# Patient Record
Sex: MALE | Race: AMERICAN INDIAN OR ALASKA NATIVE
[De-identification: names, ages, dates, MRNs, and addresses within clinical notes are randomized per-mention and may not be internally consistent; named-entity substitution may affect disease eponyms.]

---

## 2022-06-11 ENCOUNTER — HOSPITAL ENCOUNTER (INPATIENT)
Dept: HOSPITAL 5 - ED | Age: 77
LOS: 6 days | Discharge: HOME | DRG: 640 | End: 2022-06-17
Attending: INTERNAL MEDICINE | Admitting: INTERNAL MEDICINE
Payer: COMMERCIAL

## 2022-06-11 DIAGNOSIS — I25.2: ICD-10-CM

## 2022-06-11 DIAGNOSIS — I49.3: ICD-10-CM

## 2022-06-11 DIAGNOSIS — E11.65: ICD-10-CM

## 2022-06-11 DIAGNOSIS — E87.1: ICD-10-CM

## 2022-06-11 DIAGNOSIS — E87.5: Primary | ICD-10-CM

## 2022-06-11 DIAGNOSIS — E11.22: ICD-10-CM

## 2022-06-11 DIAGNOSIS — N17.0: ICD-10-CM

## 2022-06-11 DIAGNOSIS — N28.1: ICD-10-CM

## 2022-06-11 DIAGNOSIS — A08.4: ICD-10-CM

## 2022-06-11 DIAGNOSIS — E87.2: ICD-10-CM

## 2022-06-11 DIAGNOSIS — N18.9: ICD-10-CM

## 2022-06-11 LAB
BASOPHILS # (AUTO): 0 K/MM3 (ref 0–0.1)
BASOPHILS NFR BLD AUTO: 0.4 % (ref 0–1.8)
EOSINOPHIL # BLD AUTO: 0.1 K/MM3 (ref 0–0.4)
EOSINOPHIL NFR BLD AUTO: 1.5 % (ref 0–4.3)
HCT VFR BLD CALC: 43.4 % (ref 35.5–45.6)
HGB BLD-MCNC: 14.8 GM/DL (ref 11.8–15.2)
LYMPHOCYTES # BLD AUTO: 1.5 K/MM3 (ref 1.2–5.4)
LYMPHOCYTES NFR BLD AUTO: 21.4 % (ref 13.4–35)
MCHC RBC AUTO-ENTMCNC: 34 % (ref 32–34)
MCV RBC AUTO: 97 FL (ref 84–94)
MONOCYTES # (AUTO): 0.6 K/MM3 (ref 0–0.8)
MONOCYTES % (AUTO): 9.2 % (ref 0–7.3)
PLATELET # BLD: 183 K/MM3 (ref 140–440)
RBC # BLD AUTO: 4.5 M/MM3 (ref 3.65–5.03)

## 2022-06-11 PROCEDURE — 76770 US EXAM ABDO BACK WALL COMP: CPT

## 2022-06-11 PROCEDURE — 84300 ASSAY OF URINE SODIUM: CPT

## 2022-06-11 PROCEDURE — 82570 ASSAY OF URINE CREATININE: CPT

## 2022-06-11 PROCEDURE — 36415 COLL VENOUS BLD VENIPUNCTURE: CPT

## 2022-06-11 PROCEDURE — 82962 GLUCOSE BLOOD TEST: CPT

## 2022-06-11 PROCEDURE — 80076 HEPATIC FUNCTION PANEL: CPT

## 2022-06-11 PROCEDURE — 83735 ASSAY OF MAGNESIUM: CPT

## 2022-06-11 PROCEDURE — 93005 ELECTROCARDIOGRAM TRACING: CPT

## 2022-06-11 PROCEDURE — 85730 THROMBOPLASTIN TIME PARTIAL: CPT

## 2022-06-11 PROCEDURE — 80048 BASIC METABOLIC PNL TOTAL CA: CPT

## 2022-06-11 PROCEDURE — 85025 COMPLETE CBC W/AUTO DIFF WBC: CPT

## 2022-06-11 PROCEDURE — 87040 BLOOD CULTURE FOR BACTERIA: CPT

## 2022-06-11 PROCEDURE — 85610 PROTHROMBIN TIME: CPT

## 2022-06-11 PROCEDURE — 83036 HEMOGLOBIN GLYCOSYLATED A1C: CPT

## 2022-06-11 PROCEDURE — 81001 URINALYSIS AUTO W/SCOPE: CPT

## 2022-06-11 NOTE — EMERGENCY DEPARTMENT REPORT
ED General Adult HPI





- General


Chief complaint: Recheck/Abnormal Lab/Rx


Stated complaint: ABNORMAL LAB VALUES


Time Seen by Provider: 06/11/22 23:05


Source: patient, EMS


Mode of arrival: Stretcher


Limitations: No Limitations





- History of Present Illness


Initial comments: 





Patient is 77 years old male with history of atrial fibrillation.  Patient 

brought to the emergency room via EMS from [facility] urgent care for 

evaluation of abnormal labs.  Patient presented to the urgent care complaining 

of diarrhea for 1 week.  Patient describes his diarrhea as watery.  Denies any 

abdominal pain.  No nausea or vomiting.  Patient also denied any fever or 

chills.  No chest pain or shortness of breath.  Urgent care lab work-up found 

patient to have a potassium of 7.2 creatinine of 8.3.  Patient received calcium 

chloride, normal saline and magnesium sulfate by urgent care.  Upon arrival to 

the ER patient is alert, oriented x3 in no acute distress.





- Related Data


                                    Allergies











Allergy/AdvReac Type Severity Reaction Status Date / Time


 


No Known Allergies Allergy   Unverified 06/11/22 23:05














ED Review of Systems


ROS: 


Stated complaint: ABNORMAL LAB VALUES


Other details as noted in HPI





Comment: All other systems reviewed and negative


Constitutional: denies: chills, fever


Respiratory: denies: cough, shortness of breath, SOB with exertion, SOB at rest,

wheezing


Cardiovascular: denies: chest pain, palpitations, dyspnea on exertion, orthopnea


Gastrointestinal: denies: abdominal pain, nausea, vomiting


Musculoskeletal: denies: back pain


Neurological: denies: headache, weakness, numbness, paresthesias, confusion, 

abnormal gait





ED Past Medical Hx





- Past Medical History


Previous Medical History?: Yes


Hx Heart Attack/AMI: Yes


Hx Diabetes: Yes





- Social History


Smoking Status: Never Smoker


Substance Use Type: None





ED Physical Exam





- General


Limitations: No Limitations


General appearance: alert, in no apparent distress





- Head


Head exam: Present: atraumatic, normocephalic, normal inspection





- Eye


Eye exam: Present: normal appearance





- ENT


ENT exam: Present: mucous membranes dry





- Neck


Neck exam: Present: normal inspection, full ROM.  Absent: tenderness, meningism

us





- Respiratory


Respiratory exam: Present: normal lung sounds bilaterally





- Cardiovascular


Cardiovascular Exam: Present: regular rate, normal rhythm, normal heart sounds





- GI/Abdominal


GI/Abdominal exam: Present: soft, normal bowel sounds.  Absent: distended, 

tenderness, guarding, rebound, rigid, organomegaly, mass, bruit, pulsatile mass,

hernia





- Extremities Exam


Extremities exam: Present: normal inspection, full ROM, normal capillary refill.

 Absent: tenderness





- Back Exam


Back exam: Present: normal inspection, full ROM.  Absent: CVA tenderness (R), 

CVA tenderness (L)





- Neurological Exam


Neurological exam: Present: alert, oriented X3, CN II-XII intact





- Psychiatric


Psychiatric exam: Present: normal mood





- Skin


Skin exam: Present: warm, intact, normal color





ED Course


                                   Vital Signs











  06/11/22





  22:53


 


Temperature 98 F


 


Pulse Rate 84


 


Respiratory 18





Rate 


 


Blood Pressure 128/61


 


O2 Sat by Pulse 98





Oximetry 














ED Medical Decision Making





- Lab Data


Result diagrams: 


                                 06/11/22 23:23





                                 06/11/22 23:23





- EKG Data


-: EKG Interpreted by Me


EKG shows normal: sinus rhythm


Rate: normal





- EKG Data


Interpretation: no acute changes





- Radiology Data


Radiology results: report reviewed





- Medical Decision Making





Patient is 77 years old male with history of atrial fibrillation.  Patient 

brought to the emergency room via EMS from [facility] urgent care for 

evaluation of abnormal labs.  Patient presented to the urgent care complaining 

of diarrhea for 1 week.  Patient describes his diarrhea as watery.  Denies any 

abdominal pain.  No nausea or vomiting.  Patient also denied any fever or 

chills.  No chest pain or shortness of breath.  Urgent care lab work-up found akosua agudelo to have a potassium of 7.2 creatinine of 8.3.  Patient received calcium 

chloride, normal saline and magnesium sulfate by urgent care.  Upon arrival to 

the ER patient is alert, oriented x3 in no acute distress.





Patient received normal saline, D50 and insulin.  I repeated his labs his 

potassium is coming down to 5.9 creatinine is still 8.3 with a BUN of 80.  I 

discussed the patient with Dr. Valverde from [facility], he reviewed 

patient previous record and stated that his creatinine was 1.22 weeks ago.  He 

also  advised to admit the patient to [facility].





I discussed the patient with Dr. Esparza, nephrologist on-call and he advised to 

start patient on a bicarb drip and give albuterol.


I discussed the patient with Dr. Grimm, he agreed to admit the patient to 

medical service for further management.








Critical Care Time: Yes


Critical care time in (mins) excluding proc time.: 35


Critical care attestation.: 


If time is entered above; I have spent that time in minutes in the direct care 

of this critically ill patient, excluding procedure time.








ED Disposition


Clinical Impression: 


 Acute renal failure, Acute hyperkalemia, Acute diarrhea





Disposition: 09 ADMITTED AS INPATIENT


Is pt being admited?: Yes


Condition: Stable

## 2022-06-12 LAB
ALBUMIN SERPL-MCNC: 3.8 G/DL (ref 3.9–5)
ALT SERPL-CCNC: 37 UNITS/L (ref 7–56)
APTT BLD: 44.6 SEC. (ref 24.2–36.6)
BILIRUB DIRECT SERPL-MCNC: < 0.2 MG/DL (ref 0–0.2)
BUN SERPL-MCNC: 76 MG/DL (ref 9–20)
BUN SERPL-MCNC: 83 MG/DL (ref 9–20)
BUN/CREAT SERPL: 10 %
BUN/CREAT SERPL: 10 %
CALCIUM SERPL-MCNC: 8.2 MG/DL (ref 8.4–10.2)
CALCIUM SERPL-MCNC: 8.9 MG/DL (ref 8.4–10.2)
HEMOLYSIS INDEX: 148
HEMOLYSIS INDEX: 6
INR PPP: 3.8 (ref 0.87–1.13)

## 2022-06-12 RX ADMIN — INSULIN LISPRO SCH: 100 INJECTION, SOLUTION INTRAVENOUS; SUBCUTANEOUS at 23:40

## 2022-06-12 RX ADMIN — INSULIN LISPRO SCH: 100 INJECTION, SOLUTION INTRAVENOUS; SUBCUTANEOUS at 12:25

## 2022-06-12 RX ADMIN — SODIUM CHLORIDE SCH MLS/HR: 0.9 INJECTION, SOLUTION INTRAVENOUS at 05:23

## 2022-06-12 RX ADMIN — SODIUM CHLORIDE SCH MLS/HR: 0.9 INJECTION, SOLUTION INTRAVENOUS at 11:15

## 2022-06-12 RX ADMIN — Medication SCH ML: at 22:11

## 2022-06-12 RX ADMIN — SODIUM BICARBONATE SCH MLS/HR: 84 INJECTION, SOLUTION INTRAVENOUS at 15:22

## 2022-06-12 RX ADMIN — INSULIN LISPRO SCH: 100 INJECTION, SOLUTION INTRAVENOUS; SUBCUTANEOUS at 17:55

## 2022-06-12 RX ADMIN — LOPERAMIDE HYDROCHLORIDE PRN MG: 2 CAPSULE ORAL at 22:09

## 2022-06-12 RX ADMIN — Medication SCH ML: at 10:45

## 2022-06-12 RX ADMIN — DEXTROSE MONOHYDRATE PRN ML: 25 INJECTION, SOLUTION INTRAVENOUS at 18:52

## 2022-06-12 NOTE — EVENT NOTE
Date: 06/12/22





Patient seen and examined


This is the second visit after midnight


77-year-old male with known history of atrial fibrillation presents to the emerg

ency room via EMS for evaluation of abnormal labs done [place] [facility] 

[place]ent care facility.  Patient had gone to the urgent care facility with a 

complaint of diarrhea which has been ongoing for about a week.  


Labs done at the urgent care facility reveals elevated potassium level of 7.2 

and creatinine of 8.3.  


Patient was given insulin and glucose and nebulizing treatment at the facility.


A repeat test done in the emergency room showed a potassium level of 5.9 and 

creatinine of 8.2 with a BUN of 83.


EKG was unremarkable.


Nephrologist on-call was immediately consulted by the ER physician and patient 

also admitted to the hospital for further evaluation and management


 with acute kidney failure, hyperkalemia and diarrhea.


Today patient continued to have diarrhea, repeat potassium 7.0, bicarb ordered 

5, creatinine 7.3 and BUN 76


Ordered for the sodium bicarbonate drip, ordered another 5 units of insulin, 2 g

of calcium gluconate


Ordered CT chest for diarrhea, and loperamide


Continue to follow BMP, guarded prognosis


Patient denies any chest pain or shortness of breath, vitals noted and stable


Avoid nephrotoxins





-It took me about 28 minutes to reevaluate and reasses this patient, discussed 

with RN/CM, review medical documents, lab results, imaging, medication list and 

placing order.

## 2022-06-12 NOTE — CONSULTATION
History of Present Illness





- History of Present Illness


Thank you for the consultation !





Patient was evaluated today, 





My assessment and plan are as follows


#Acute kidney injury, most likely resulting from severe volume depletion the 

patient is a 77-year-old with a known baseline creatinine of around 1.2,


Is currently being treated with IV hydration, suggest close monitoring of renal 

function given that his baseline creatinine is near normal


Renal prognosis remains guarded at this time


In the outpatient setting he is being followed by [facility]





#Hyperkalemia patient potassium was 7.3 currently at 5.9 with severe acidosis 

bicarbonate of 13 in the setting of diarrhea, believe upon correction of 

acidosis his potassium should get normal or even lower to follow, his 

hyperkalemia has been treated conservatively here did not require dialysis





#Hyponatremia most likely resulting from renal failure, volume depletion, to 

monitor and follow





Blood pressure has been low around 101/65 this morning,





Will order further labs, renal ultrasonogram, urinalysis, continue with 

supportive care IV fluid boluses can be administered if required, patient 

appears to be severely volume depleted,


If you have any question in regards to this patient renal care please feel free 

to contact me at [phone]





Author:


Karlo Esparza M.D.


[facility], [address]


[address]


[place] [address]


Tel; [phone]


[employer]





Source of information: From patient 





History of present illness





Patient is a 77-year-old male who has been admitted here after he was noted to 

be in acute kidney injury had labs drawn at [facility] which showed a potassium of 

7.3, creatinine earlier a few week ago was around 1.2 as noted in the chart 


Patient has been having issues with watery stool, very poor appetite, feeling 

weak, in the outpatient setting his potassium was noted to be around 7.2 however

here at 5.9, his hyperkalemia has been treated here conservatively, currently 

pending new set of labs,





Past medical history:


Reviewed from the current chart





Current allergies: Reviewed from the current chart





Social history: Reviewed from the current chart





Family history: Reviewed from the current chart








Review of system:


Positive for


All other review of systems negative





Physical examination


Vitals: Reviewed


General: No acute distress


HEENT: Oral mucosa appears to be dry


Neck: Supple without any JVD thyromegaly or nodular mass


Chest: Clear to auscultation


Heart: Regular rate and rhythm S1-S2 heard no S3-S4


Abdomen: Soft nontender, bowel sounds present no renal bruit no suprapubic 

masses no CVA tenderness noted


Extremity: No edema skin turgor reduced endocrine: Thyroid not enlarged


Psychiatric: No agitation and aggression noted


Musculoskeletal: No joint effusion noted








Labs and x-rays: Reviewed from this admission











Past History


Past Medical History: acute MI, diabetes


Past Surgical History: Other (Right partial foot amputation)


Social history: no significant social history


Family history: no significant family history





Medications and Allergies


                                    Allergies











Allergy/AdvReac Type Severity Reaction Status Date / Time


 


No Known Allergies Allergy   Unverified 06/11/22 23:05











Active Meds: 


Active Medications





Acetaminophen (Acetaminophen 325 Mg Tab)  650 mg PO Q4H PRN


   PRN Reason: Pain MILD(1-3)/Fever >100.5/HA


Dextrose (Dextrose 50% In Water (25gm) 50 Ml Syringe)  0 ml IV Q30MIN PRN; 

Protocol


   PRN Reason: Hypoglycemia


Sodium Chloride (Nacl 0.9% 1000 Ml)  1,000 mls @ 125 mls/hr IV AS DIRECT RIN


   Last Admin: 06/12/22 05:23 Dose:  125 mls/hr


   


Insulin Human Lispro (Insulin Lispro 100 Unit/Ml)  0 unit SUB-Q ACHS RIN; 

Protocol


Magnesium Hydroxide (Magnesium Hydroxide (Mom) Oral Liqd Udc)  30 ml PO Q4H PRN


   PRN Reason: Constipation


Morphine Sulfate (Morphine 2 Mg/1 Ml Inj)  2 mg IV Q4H PRN


   PRN Reason: Pain, Moderate (4-6)


Morphine Sulfate (Morphine 4 Mg/1 Ml Inj)  4 mg IV Q4H PRN


   PRN Reason: Pain , Severe (7-10)


Ondansetron HCl (Ondansetron 4 Mg/2 Ml Inj)  4 mg IV Q8H PRN


   PRN Reason: Nausea And Vomiting


Sodium Chloride (Sodium Chloride 0.9% 10 Ml Flush Syringe)  10 ml IV BID RIN


Sodium Chloride (Sodium Chloride 0.9% 10 Ml Flush Syringe)  10 ml IV PRN PRN


   PRN Reason: LINE FLUSH











Exam





- Vital Signs


Vital signs: 


                                   Vital Signs











Temp Pulse Resp BP Pulse Ox


 


 98 F   84   18   128/61   98 


 


 06/11/22 22:53  06/11/22 22:53  06/11/22 22:53  06/11/22 22:53  06/11/22 22:53














Results





- Lab Results





                                 06/11/22 23:23





                                 06/11/22 23:23


                             Most recent lab results











Calcium  8.9 mg/dL (8.4-10.2)   06/11/22  23:23

## 2022-06-12 NOTE — HISTORY AND PHYSICAL REPORT
History of Present Illness


Date of examination: 06/12/22


Date of admission: 


06/12/2022


Chief complaint: 





Abnormal labs


History of present illness: 





77-year-old male with known history of atrial fibrillation presents to the 

emergency room today via EMS for evaluation of abnormal labs done at the [facility] urgent care facility.  Patient had gone to the urgent care facility 

with a complaint of diarrhea which has been ongoing for about a week.  He denies

any abdominal pain, no fever or chills, no chest pain or shortness of breath, no

headache or dizziness and no diaphoresis.


Patient denies any sick contacts and no recent travel.  Denies any contact with 

anyone with COVID-19.





Labs done at the urgent care facility reveals elevated potassium level of 7.2 an

d creatinine of 8.3.  Patient was given insulin and glucose and nebulizing 

treatment at the facility.





A repeat test done in the emergency room here shows a potassium level of 5.9 and

creatinine of 3.2 with a BUN of 83.


EKG was unremarkable.


Nephrologist on-call was immediately consulted by the ER physician and 

recommendation was to commence patient on sodium bicarbonate drip.





Patient is being admitted with acute kidney failure, hyperkalemia and diarrhea.





Past History


Past Medical History: acute MI, diabetes


Past Surgical History: Other (Right partial foot amputation)


Social history: no significant social history


Family history: no significant family history





Medications and Allergies


                                    Allergies











Allergy/AdvReac Type Severity Reaction Status Date / Time


 


No Known Allergies Allergy   Unverified 06/11/22 23:05














Review of Systems


Constitutional: no fever, no chills


Ears, nose, mouth and throat: no nasal congestion, no sore throat


Cardiovascular: no chest pain, no palpitations


Respiratory: no cough, no shortness of breath


Gastrointestinal: diarrhea, no abdominal pain, no nausea, no vomiting


Genitourinary Male: no dysuria, no hematuria, no flank pain, no nocturia, no 

polyuria


Musculoskeletal: no neck pain, no low back pain


Integumentary: no rash, no pruritis


Neurological: no headaches, no confusion


Psychiatric: no anxiety, no depression


Endocrine: no polyphagia, no polydipsia, no polyuria, no nocturia





Exam





- Constitutional


Vitals: 


                                        











Temp Pulse Resp BP Pulse Ox


 


 98 F   77   18   109/64   96 


 


 06/11/22 22:53  06/12/22 03:15  06/12/22 03:15  06/12/22 03:15  06/12/22 01:15











General appearance: Present: no acute distress, well-nourished





- EENT


Eyes: Present: PERRL, EOM intact.  Absent: scleral icterus


ENT: hearing intact, clear oral mucosa, dentition normal





- Neck


Neck: Present: supple, normal ROM





- Respiratory


Respiratory effort: normal


Respiratory: bilateral: CTA





- Cardiovascular


Rhythm: regular


Heart Sounds: Present: S1 & S2, rub, click.  Absent: systolic murmur, diastolic 

murmur





- Extremities


Extremities: no ischemia, pulses intact, pulses symmetrical, No edema, normal 

temperature, normal color, Full ROM, abnormal (Partial amputation of the right 

foot)


Peripheral Pulses: within normal limits





- Abdominal


General gastrointestinal: Present: soft, non-tender, non-distended, normal bowel

sounds.  Absent: mass





- Integumentary


Integumentary: Present: clear, warm, dry, normal turgor.  Absent: rash





- Musculoskeletal


Musculoskeletal: strength equal bilaterally





- Psychiatric


Psychiatric: appropriate mood/affect, intact judgment & insight, memory intact, 

cooperative





- Neurologic


Neurologic: CNII-XII intact, no focal deficits, moves all extremities





Results





- Labs


CBC & Chem 7: 


                                 06/11/22 23:23





                                 06/11/22 23:23


Labs: 


                              Abnormal lab results











  06/11/22 06/11/22 06/11/22 Range/Units





  23:23 23:23 23:38 


 


MCV  97 H    (84-94)  fl


 


MCH  33 H    (28-32)  pg


 


RDW  15.6 H    (13.2-15.2)  %


 


Mono % (Auto)  9.2 H    (0.0-7.3)  %


 


PT    42.7 H  (12.2-14.9)  Sec.


 


INR    3.80 H  (0.87-1.13)  


 


APTT    44.6 H  (24.2-36.6)  Sec.


 


Sodium   135 L   (137-145)  mmol/L


 


Potassium   5.9 H   (3.6-5.0)  mmol/L


 


Carbon Dioxide   13 L   (22-30)  mmol/L


 


BUN   83 H   (9-20)  mg/dL


 


Creatinine   8.2 H   (0.8-1.3)  mg/dL


 


Alkaline Phosphatase   243 H   ()  units/L


 


Total Protein   8.3 H   (6.3-8.2)  g/dL


 


Albumin   3.8 L   (3.9-5)  g/dL














Assessment and Plan





Assessment:





1.  Acute renal failure





2.  Hypokalemia





3.  Diarrhea-etiology unclear





4.  History of A. fib-rate controlled





Plan:





1.  Patient admitted and placed on IV fluid and sodium bicarb drip as 

recommended by the nephrologist.





2.  We will monitor patient on telemetry.





3.  We will continue to monitor potassium levels.





4.  We will resume routine home medications once reconciled.





5.  Consult placed to nephrology for further evaluation and recommendations.








DVT prophylaxis: Subcutaneous heparin








CODE STATUS: Patient is full code

## 2022-06-12 NOTE — ELECTROCARDIOGRAPH REPORT
[facility]

                                       

Test Date:    2022               Test Time:    01:51:42

Pat Name:     SOFIA CARRILLO            Department:   

Patient ID:   [id number]          Room:         A477 1

Gender:       M                        Technician:   MACY

:          [id number]               Requested By: ALBERT ARRIAGA

Order Number: [id number]              Reading MD:   Magi Cali

                                 Measurements

Intervals                              Axis          

Rate:         73                       P:            0

MI:           184                      QRS:          -49

QRSD:         106                      T:            74

QT:           418                                    

QTc:          465                                    

                           Interpretive Statements

Sinus rhythm

Multiform ventricular premature complexes

Inferior infarct, old

Anterior infarct, old

No previous ECG available for comparison

Electronically Signed On 2022 21:40:42 EDT by Magi Cali

## 2022-06-13 LAB
BASOPHILS # (AUTO): 0 K/MM3 (ref 0–0.1)
BASOPHILS NFR BLD AUTO: 0.3 % (ref 0–1.8)
BUN SERPL-MCNC: 71 MG/DL (ref 9–20)
BUN/CREAT SERPL: 11 %
CALCIUM SERPL-MCNC: 8.8 MG/DL (ref 8.4–10.2)
EOSINOPHIL # BLD AUTO: 0.1 K/MM3 (ref 0–0.4)
EOSINOPHIL NFR BLD AUTO: 2.4 % (ref 0–4.3)
HCT VFR BLD CALC: 41.1 % (ref 35.5–45.6)
HEMOLYSIS INDEX: 12
HGB BLD-MCNC: 13.5 GM/DL (ref 11.8–15.2)
LYMPHOCYTES # BLD AUTO: 1 K/MM3 (ref 1.2–5.4)
LYMPHOCYTES NFR BLD AUTO: 20.7 % (ref 13.4–35)
MCHC RBC AUTO-ENTMCNC: 33 % (ref 32–34)
MCV RBC AUTO: 96 FL (ref 84–94)
MONOCYTES # (AUTO): 0.5 K/MM3 (ref 0–0.8)
MONOCYTES % (AUTO): 10.8 % (ref 0–7.3)
PLATELET # BLD: 152 K/MM3 (ref 140–440)
RBC # BLD AUTO: 4.29 M/MM3 (ref 3.65–5.03)

## 2022-06-13 RX ADMIN — LOPERAMIDE HYDROCHLORIDE PRN MG: 2 CAPSULE ORAL at 09:19

## 2022-06-13 RX ADMIN — ONDANSETRON PRN MG: 2 INJECTION INTRAMUSCULAR; INTRAVENOUS at 06:16

## 2022-06-13 RX ADMIN — ONDANSETRON PRN MG: 2 INJECTION INTRAMUSCULAR; INTRAVENOUS at 09:19

## 2022-06-13 RX ADMIN — LOPERAMIDE HYDROCHLORIDE PRN MG: 2 CAPSULE ORAL at 15:02

## 2022-06-13 RX ADMIN — LOPERAMIDE HYDROCHLORIDE PRN MG: 2 CAPSULE ORAL at 01:20

## 2022-06-13 RX ADMIN — INSULIN LISPRO SCH: 100 INJECTION, SOLUTION INTRAVENOUS; SUBCUTANEOUS at 16:55

## 2022-06-13 RX ADMIN — Medication SCH ML: at 09:19

## 2022-06-13 RX ADMIN — INSULIN LISPRO SCH: 100 INJECTION, SOLUTION INTRAVENOUS; SUBCUTANEOUS at 22:00

## 2022-06-13 RX ADMIN — INSULIN LISPRO SCH: 100 INJECTION, SOLUTION INTRAVENOUS; SUBCUTANEOUS at 13:30

## 2022-06-13 RX ADMIN — Medication SCH ML: at 22:00

## 2022-06-13 RX ADMIN — INSULIN LISPRO SCH: 100 INJECTION, SOLUTION INTRAVENOUS; SUBCUTANEOUS at 09:18

## 2022-06-13 NOTE — ULTRASOUND REPORT
ULTRASOUND RENAL



INDICATION / CLINICAL INFORMATION:

NICOLASA.



COMPARISON:

None available.



FINDINGS:

RIGHT KIDNEY: Length = 10.6 cm.    [normal > 9 cm] 

- Parenchymal Thickness = 1.4 cm.  [normal > 1.5 cm] 

- Echogenicity: Normal.

- Hydronephrosis: None.

- Cyst or mass: 1 cm exophytic cyst is noted near mid pole anteriorly.  

- Stones: None seen. 



LEFT KIDNEY: Length = 9.4 cm.       [normal > 9 cm] 

- Parenchymal Thickness = 1.5 cm.  [normal > 1.5 cm] 

- Echogenicity: Normal.

- Hydronephrosis: None.

- Cyst or mass: No significant abnormality.  

- Stones: None seen. 



URINARY BLADDER: No significant abnormality.

FREE FLUID: None.



ADDITIONAL FINDINGS: None.



IMPRESSION:

Small right renal cyst, otherwise, unremarkable exam.



Signer Name: Estuardo Dodge Jr, MD 

Signed: 6/13/2022 1:21 PM

Workstation Name: [id number]

## 2022-06-13 NOTE — PROGRESS NOTE
Assessment and Plan





Impression:


* Acute kidney injury secondary volume depletion on chronic kidney disease


   --Baseline renal function unknown; followed by [facility]


* Hyperkalemia, severe


* Metabolic acidosis


* Diarrhea





Plan:


* No acute indication for renal replacement therapy at this time


* Hyperkalemia resolved with medical management - continue


* Continue bicarb gtt


* Will obtain UA and urine lytes


* Will obtain renal ultrasound


* Dose medications for renal function


* Avoid potential nephrotoxins


* Strict I/O


* AM labs





Subjective


Date of service: 06/13/22


Interval history: 





Patient reports diarrhea.  He denies abdominal pain, nausea, vomiting.





Objective





- Vital Signs


Vital signs: 


                               Vital Signs - 12hr











  06/12/22 06/12/22 06/13/22





  21:31 22:00 01:15


 


Temperature 98.1 F  97.8 F


 


Pulse Rate 55 L  84


 


Respiratory 16  16





Rate   


 


Blood Pressure 138/72  147/83


 


O2 Sat by Pulse 98 97 97





Oximetry   














  06/13/22 06/13/22





  05:12 07:53


 


Temperature 98.5 F 98.3 F


 


Pulse Rate 65 52 L


 


Respiratory 16 





Rate  


 


Blood Pressure 141/67 154/72


 


O2 Sat by Pulse 92 91





Oximetry  














- General Appearance


General appearance: well-developed, well-nourished


EENT: ATNC


Respiratory: Present: Clear to Ascultation


Cardiology: regular, S1S2


Gastrointestinal: normal, no tenderness, no distended


Integumentary: no rash


Neurologic: alert and oriented x3


Psychiatric: cooperative





- Lab





                                 06/13/22 05:27





                                 06/13/22 05:27


                             Most recent lab results











Calcium  8.8 mg/dL (8.4-10.2)   06/13/22  05:27    














Medications & Allergies





- Medications


Allergies/Adverse Reactions: 


                                    Allergies





No Known Allergies Allergy (Unverified 06/11/22 23:05)


   








Active Medications: 














Generic Name Dose Route Start Last Admin





  Trade Name Freq  PRN Reason Stop Dose Admin


 


Acetaminophen  650 mg  06/12/22 03:36 





  Acetaminophen 325 Mg Tab  PO  





  Q4H PRN  





  Pain MILD(1-3)/Fever >100.5/HA  


 


Dextrose  0 ml  06/12/22 03:36  06/12/22 18:52





  Dextrose 50% In Water (25gm) 50 Ml Syringe  IV   25 ml





  Q30MIN PRN   Administration





  Hypoglycemia  





  Protocol  


 


Sodium Bicarbonate 150 meq/  1,150 mls @ 75 mls/hr  06/12/22 15:00  06/12/22 

15:22





  Dextrose  IV   75 mls/hr





  AS DIRECT RIN   Administration


 


Insulin Human Lispro  0 unit  06/12/22 07:30  06/12/22 23:40





  Insulin Lispro 100 Unit/Ml  SUB-Q   Not Given





  [facility]  





  Protocol  


 


Loperamide HCl  2 mg  06/12/22 17:29  06/13/22 01:20





  Loperamide 2 Mg Cap  PO   2 mg





  Q2H PRN   Administration





  Diarrhea  


 


Magnesium Hydroxide  30 ml  06/12/22 03:36 





  Magnesium Hydroxide (Mom) Oral Liqd Udc  PO  





  Q4H PRN  





  Constipation  


 


Morphine Sulfate  2 mg  06/12/22 03:36 





  Morphine 2 Mg/1 Ml Inj  IV  





  Q4H PRN  





  Pain, Moderate (4-6)  


 


Morphine Sulfate  4 mg  06/12/22 03:36 





  Morphine 4 Mg/1 Ml Inj  IV  





  Q4H PRN  





  Pain , Severe (7-10)  


 


Ondansetron HCl  4 mg  06/12/22 03:36  06/13/22 06:16





  Ondansetron 4 Mg/2 Ml Inj  IV   4 mg





  Q8H PRN   Administration





  Nausea And Vomiting  


 


Sodium Chloride  10 ml  06/12/22 10:00  06/12/22 22:11





  Sodium Chloride 0.9% 10 Ml Flush Syringe  IV   10 ml





  BID RIN   Administration


 


Sodium Chloride  10 ml  06/12/22 03:36 





  Sodium Chloride 0.9% 10 Ml Flush Syringe  IV  





  PRN PRN  





  LINE FLUSH

## 2022-06-13 NOTE — PROGRESS NOTE
Assessment and Plan





77-year-old male with known history of atrial fibrillation presents to the 

emergency room via EMS for evaluation of abnormal labs done at the [facility] urgent care facility.  Patient had gone to the urgent care facility 

with a complaint of diarrhea which has been ongoing for about a week.  Labs done

at the urgent care facility reveals elevated potassium level of 7.2 and 

creatinine of 8.3.  


Patient was given insulin and glucose and nebulizing treatment at the facility 

and sent to [facility] ER for further evaluation and management.





Assessment:


--Acute renal failure likely ATN with history of underlying CKD unknown baseline

renal function


-- Severe hyperkalemia


--Diarrhea-etiology unclear


-- History of A. fib-rate controlled





Plan:


6/12/22: 


A repeat test done in the emergency room showed a potassium level of 5.9 and 

creatinine of 8.2 with a BUN of 83.


EKG was unremarkable.


Nephrologist on-call was immediately consulted by the ER physician and patient 

also admitted to the hospital for further evaluation and management


 with acute kidney failure, hyperkalemia and diarrhea.


Today patient continued to have diarrhea, repeat potassium 7.0, bicarb ordered 

5, creatinine 7.3 and BUN 76


Ordered for the sodium bicarbonate drip, ordered another 5 units of insulin, 2 g

of calcium gluconate


Ordered CT chest for diarrhea, and loperamide


Continue to follow BMP, guarded prognosis


Patient denies any chest pain or shortness of breath, vitals noted and stable


Avoid nephrotoxins





6/13/22; 


Continue sodium bicarbonate drip


Potassium 5.1 today With creatinine 6.2


Avoid nephrotoxins, follow BMP


Discussed with nephrologist and no plan for renal replacement therapy at this 

time


Order C. difficile study yesterday -unlikely to be positive as patient does not 

appear to be toxic from underlying infection


Added loperamide as needed for diarrhea








Subjective


Date of service: 06/13/22


Interval history: 





Patient seen and examined.  Medical records and medication list reviewed.  


No acute event overnight noted by the RN.  


Patient denies any chest pain or difficulty breathing.  Patient is tolerating 

diet.  


Continue to have diarrhea


Discussed plan of care at bedside with patient.








Objective





- Exam


Narrative Exam: 





GENERAL:  well-developed and well-nourished elderly -American male lying 

on bed appeared to be in no discomfort. 


HEENT: Normocephalic.  Atraumatic.  No conjunctival congestion or icterus. 

Patient has moist mucous membranes.


NECK: Supple.  Trachea midline.


CHEST/LUNGS: Clear to auscultated bilaterally, breathing nonlabored. No wheezes 

crackles or rhonchi.


HEART/CARDIOVASCULAR: Regular in rate and rhythm.  S1 and S2 positive.


ABDOMEN: Abdomen is soft, nontender.  Patient has normal bowel sounds.  


SKIN: There is no rash.  Warm and dry.


NEURO:  No focal motor deficit.  Follows command.


MUSCULOSKELETAL: No joint effusion or tenderness.


EXTRIMITY: No edema, no cyanosis or clubbing.


PSYCH:  Cooperative.





- Constitutional


Vitals: 


                               Vital Signs - 12hr











  06/13/22 06/13/22 06/13/22





  05:12 07:53 10:00


 


Temperature 98.5 F 98.3 F 


 


Pulse Rate 65 52 L 81


 


Respiratory 16  





Rate   


 


Blood Pressure 141/67 154/72 


 


O2 Sat by Pulse 92 91 95





Oximetry   














  06/13/22





  15:46


 


Temperature 97.9 F


 


Pulse Rate 84


 


Respiratory 





Rate 


 


Blood Pressure 130/79


 


O2 Sat by Pulse 95





Oximetry 














- Labs


CBC & Chem 7: 


                                 06/13/22 05:27





                                 06/13/22 05:27


Labs: 


                              Abnormal lab results











  06/12/22 06/12/22 06/12/22 Range/Units





  15:11 16:50 18:34 


 


MCV     (84-94)  fl


 


Mono % (Auto)     (0.0-7.3)  %


 


Lymph # (Auto)     (1.2-5.4)  K/mm3


 


Potassium  7.0 H*    (3.6-5.0)  mmol/L


 


Carbon Dioxide  5 L* D    (22-30)  mmol/L


 


BUN     (9-20)  mg/dL


 


Creatinine  7.3 H    (0.8-1.3)  mg/dL


 


Glucose     ()  mg/dL


 


POC Glucose   53 L  38 L  ()  mg/dL














  06/12/22 06/12/22 06/13/22 Range/Units





  18:40 18:58 05:27 


 


MCV    96 H  (84-94)  fl


 


Mono % (Auto)    10.8 H  (0.0-7.3)  %


 


Lymph # (Auto)    1.0 L  (1.2-5.4)  K/mm3


 


Potassium     (3.6-5.0)  mmol/L


 


Carbon Dioxide     (22-30)  mmol/L


 


BUN     (9-20)  mg/dL


 


Creatinine     (0.8-1.3)  mg/dL


 


Glucose     ()  mg/dL


 


POC Glucose  47 L  19 L   ()  mg/dL














  06/13/22 06/13/22 06/13/22 Range/Units





  05:27 12:38 15:12 


 


MCV     (84-94)  fl


 


Mono % (Auto)     (0.0-7.3)  %


 


Lymph # (Auto)     (1.2-5.4)  K/mm3


 


Potassium  5.1 H D    (3.6-5.0)  mmol/L


 


Carbon Dioxide  16 L D    (22-30)  mmol/L


 


BUN  71 H    (9-20)  mg/dL


 


Creatinine  6.2 H    (0.8-1.3)  mg/dL


 


Glucose  130 H    ()  mg/dL


 


POC Glucose   33 L  46 L  ()  mg/dL














  06/13/22 Range/Units





  15:26 


 


MCV   (84-94)  fl


 


Mono % (Auto)   (0.0-7.3)  %


 


Lymph # (Auto)   (1.2-5.4)  K/mm3


 


Potassium   (3.6-5.0)  mmol/L


 


Carbon Dioxide   (22-30)  mmol/L


 


BUN   (9-20)  mg/dL


 


Creatinine   (0.8-1.3)  mg/dL


 


Glucose   ()  mg/dL


 


POC Glucose  117 H  ()  mg/dL

## 2022-06-14 LAB
BACTERIA #/AREA URNS HPF: (no result) /HPF
BILIRUB UR QL STRIP: (no result)
BLOOD UR QL VISUAL: (no result)
BUN SERPL-MCNC: (no result) MG/DL (ref 9–20)
BUN SERPL-MCNC: (no result) MG/DL (ref 9–20)
BUN SERPL-MCNC: 60 MG/DL (ref 9–20)
BUN/CREAT SERPL: (no result) %
BUN/CREAT SERPL: (no result) %
BUN/CREAT SERPL: 14 %
CALCIUM SERPL-MCNC: (no result) MG/DL (ref 8.4–10.2)
CALCIUM SERPL-MCNC: (no result) MG/DL (ref 8.4–10.2)
CALCIUM SERPL-MCNC: 8.5 MG/DL (ref 8.4–10.2)
HEMOLYSIS INDEX: (no result)
HEMOLYSIS INDEX: 192
HEMOLYSIS INDEX: 21
MUCOUS THREADS #/AREA URNS HPF: (no result) /HPF
PH UR STRIP: 5 [PH] (ref 5–7)
PROT UR STRIP-MCNC: (no result) MG/DL
RBC #/AREA URNS HPF: 1 /HPF (ref 0–6)
UROBILINOGEN UR-MCNC: < 2 MG/DL (ref ?–2)
WBC #/AREA URNS HPF: 2 /HPF (ref 0–6)

## 2022-06-14 RX ADMIN — ONDANSETRON PRN MG: 2 INJECTION INTRAMUSCULAR; INTRAVENOUS at 08:52

## 2022-06-14 RX ADMIN — INSULIN LISPRO SCH: 100 INJECTION, SOLUTION INTRAVENOUS; SUBCUTANEOUS at 17:14

## 2022-06-14 RX ADMIN — DEXTROSE MONOHYDRATE PRN ML: 25 INJECTION, SOLUTION INTRAVENOUS at 15:47

## 2022-06-14 RX ADMIN — SODIUM BICARBONATE SCH MLS/HR: 84 INJECTION, SOLUTION INTRAVENOUS at 15:37

## 2022-06-14 RX ADMIN — DEXTROSE SCH MLS/HR: 10 SOLUTION INTRAVENOUS at 17:12

## 2022-06-14 RX ADMIN — INSULIN LISPRO SCH: 100 INJECTION, SOLUTION INTRAVENOUS; SUBCUTANEOUS at 08:51

## 2022-06-14 RX ADMIN — SODIUM BICARBONATE SCH MLS/HR: 84 INJECTION, SOLUTION INTRAVENOUS at 00:44

## 2022-06-14 RX ADMIN — INSULIN LISPRO SCH: 100 INJECTION, SOLUTION INTRAVENOUS; SUBCUTANEOUS at 15:20

## 2022-06-14 RX ADMIN — LOPERAMIDE HYDROCHLORIDE PRN MG: 2 CAPSULE ORAL at 17:45

## 2022-06-14 RX ADMIN — LOPERAMIDE HYDROCHLORIDE PRN MG: 2 CAPSULE ORAL at 08:52

## 2022-06-14 RX ADMIN — INSULIN LISPRO SCH: 100 INJECTION, SOLUTION INTRAVENOUS; SUBCUTANEOUS at 22:20

## 2022-06-14 RX ADMIN — Medication SCH ML: at 22:20

## 2022-06-14 RX ADMIN — Medication SCH ML: at 16:05

## 2022-06-14 NOTE — PROGRESS NOTE
Assessment and Plan





Impression:


* Acute kidney injury secondary volume depletion on chronic kidney disease


   --Baseline renal function unknown; followed by [facility]


   --UA sediment bland


* Hyperkalemia, severe


* Metabolic acidosis


* Diarrhea


* Cyst of kidney, acquired


   --Renal ultrasound (June 13): nml echogenicity; 1cm exophytic cyst, right 

kidney





Plan:


* AM labs are pending - recollect ordered


* No acute indication for renal replacement therapy at this time


* Hyperkalemia resolved with medical management - continue


* Continue bicarb gtt at current rate pending AM labs


* Dose medications for renal function


* Avoid potential nephrotoxins


* Strict I/O


* AM labs





Subjective


Date of service: 06/14/22


Interval history: 





Patient has no complaints.  States that he feels better.  Per RN, patient w/ 7 

beat run VTach this AM.





Objective





- Vital Signs


Vital signs: 


                               Vital Signs - 12hr











  06/13/22 06/13/22 06/14/22





  22:00 23:53 04:34


 


Temperature  98.5 F 98.3 F


 


Pulse Rate  82 68


 


Respiratory  18 16





Rate   


 


Blood Pressure  138/71 154/83


 


O2 Sat by Pulse 96 96 100





Oximetry   














- General Appearance


General appearance: well-developed, well-nourished


EENT: ATNC


Respiratory: Present: Clear to Ascultation


Cardiology: regular, S1S2


Gastrointestinal: normal, no tenderness, no distended


Integumentary: no rash


Psychiatric: cooperative





- Lab





                                 06/13/22 05:27





                                 06/14/22 04:41


                             Most recent lab results











Calcium  TNR   06/14/22  04:41    














Medications & Allergies





- Medications


Allergies/Adverse Reactions: 


                                    Allergies





No Known Allergies Allergy (Unverified 06/11/22 23:05)


   








Active Medications: 














Generic Name Dose Route Start Last Admin





  Trade Name Freq  PRN Reason Stop Dose Admin


 


Acetaminophen  650 mg  06/12/22 03:36 





  Acetaminophen 325 Mg Tab  PO  





  Q4H PRN  





  Pain MILD(1-3)/Fever >100.5/HA  


 


Dextrose  0 ml  06/12/22 03:36  06/12/22 18:52





  Dextrose 50% In Water (25gm) 50 Ml Syringe  IV   25 ml





  Q30MIN PRN   Administration





  Hypoglycemia  





  Protocol  


 


Sodium Bicarbonate 150 meq/  1,150 mls @ 75 mls/hr  06/12/22 15:00  06/14/22 

00:44





  Dextrose  IV   75 mls/hr





  AS DIRECT RIN   Administration


 


Insulin Human Lispro  0 unit  06/12/22 07:30  06/13/22 22:00





  Insulin Lispro 100 Unit/Ml  SUB-Q   Not Given





  [facility]  





  Protocol  


 


Loperamide HCl  2 mg  06/12/22 17:29  06/13/22 15:02





  Loperamide 2 Mg Cap  PO   2 mg





  Q2H PRN   Administration





  Diarrhea  


 


Magnesium Hydroxide  30 ml  06/12/22 03:36 





  Magnesium Hydroxide (Mom) Oral Liqd Udc  PO  





  Q4H PRN  





  Constipation  


 


Morphine Sulfate  2 mg  06/12/22 03:36 





  Morphine 2 Mg/1 Ml Inj  IV  





  Q4H PRN  





  Pain, Moderate (4-6)  


 


Morphine Sulfate  4 mg  06/12/22 03:36 





  Morphine 4 Mg/1 Ml Inj  IV  





  Q4H PRN  





  Pain , Severe (7-10)  


 


Ondansetron HCl  4 mg  06/12/22 03:36  06/13/22 09:19





  Ondansetron 4 Mg/2 Ml Inj  IV   4 mg





  Q8H PRN   Administration





  Nausea And Vomiting  


 


Sodium Chloride  10 ml  06/12/22 10:00  06/13/22 22:00





  Sodium Chloride 0.9% 10 Ml Flush Syringe  IV   10 ml





  BID RIN   Administration


 


Sodium Chloride  10 ml  06/12/22 03:36 





  Sodium Chloride 0.9% 10 Ml Flush Syringe  IV  





  PRN PRN  





  LINE FLUSH

## 2022-06-14 NOTE — PROGRESS NOTE
Assessment and Plan


Assessment and plan: 


77-year-old male patient with known history of atrial fibrillation presents to 

the emergency room via EMS for evaluation of abnormal labs done at the [facility] urgent care facility.  Patient had gone to the urgent care facility 

with a complaint of diarrhea which has been ongoing for about a week.  Labs done

at the urgent care facility reveals elevated potassium level of 7.2 and 

creatinine of 8.3.  


Patient was given insulin and glucose and nebulizing treatment at the facility 

and sent to [facility] ER for further evaluation and management.





Assessment:


--Acute renal failure likely ATN with history of underlying CKD unknown baseline

renal function monitor renal function


Monitor renal function, avoid nephrotoxin, nephrology following





-- Severe hyperkalemia;


Corrected per protocol, follow electrolytes





--Episodes of hypoglycemia;


Encourage the patient to have increased oral nutrition


D50 as needed, also start D5W IV fluids closely monitor blood sugars


Check hemoglobin A1c





--Diarrhea-etiology unclear;


Mild improvement, check C. difficile


IV fluids and supportive care





-- History of A. fib-rate controlled;


Patient is in sinus rhythm with multiple PVCs


Continue supportive care





Plan:


22: 


A repeat test done in the emergency room showed a potassium level of 5.9 and 

creatinine of 8.2 with a BUN of 83.


EKG was unremarkable.


Nephrologist on-call was immediately consulted by the ER physician and patient 

also admitted to the hospital for further evaluation and management


 with acute kidney failure, hyperkalemia and diarrhea.


Today patient continued to have diarrhea, repeat potassium 7.0, bicarb ordered 

5, creatinine 7.3 and BUN 76


Ordered for the sodium bicarbonate drip, ordered another 5 units of insulin, 2 g

of calcium gluconate


Ordered CT chest for diarrhea, and loperamide


Continue to follow BMP, guarded prognosis


Patient denies any chest pain or shortness of breath, vitals noted and stable


Avoid nephrotoxins





22; 


Continue sodium bicarbonate drip


Potassium 5.1 today With creatinine 6.2


Avoid nephrotoxins, follow BMP


Discussed with nephrologist and no plan for renal replacement therapy at this 

time


Order C. difficile study yesterday -unlikely to be positive as patient does not 

appear to be toxic from underlying infection


Added loperamide as needed for diarrhea





2022; patient has episodes of hypoglycemia


D5W, IV D50, increase oral intake of sugars/orange juice


Check A1c, closely monitor


Patient did not have blood work done since morning due to some contamination of 

the sample per lab


I discussed with patient's nurse, charge nurse as well as with the lab personal,

ordered stat blood work


Follow closely





Disposition; monitor closely, follow consultants recommendations


Try to call [facility] physician, unable to contact a physician due to busy 

schedules there, left a voicemail


Encouraged him to call back to discuss patient's condition and treatment plan





Plan of care reviewed with the patient and his nurse








History


Interval history: 


I have seen and examined the patient at the bedside


Patient's chart and medications reviewed


Patient feels better


Vital signs noted


Has hypoglycemia episodes without any symptoms








Hospitalist Physical





- Constitutional


Vitals: 


                                        











Temp Pulse Resp BP Pulse Ox


 


 98.4 F   74   18   150/87   95 


 


 22 09:22  22 09:22  22 09:22  22 09:22  22 09:22











General appearance: Present: no acute distress, well-nourished





- EENT


Eyes: Present: PERRL, EOM intact





- Neck


Neck: Present: supple, normal ROM





- Respiratory


Respiratory effort: normal


Respiratory: bilateral: diminished, negative: rales, rhonchi, wheezing





- Cardiovascular


Rhythm: regular


Heart Sounds: Present: S1 & S2





- Extremities


Extremities: no ischemia, No edema





- Abdominal


General gastrointestinal: soft, non-tender, non-distended, normal bowel sounds





- Integumentary


Integumentary: Present: clear, warm





- Psychiatric


Psychiatric: appropriate mood/affect, cooperative





- Neurologic


Neurologic: moves all extremities





Results





- Labs


CBC & Chem 7: 


                                 22 05:27





                                 22 11:39


Labs: 


                             Laboratory Last Values











WBC  4.8 K/mm3 (4.5-11.0)   22  05:27    


 


RBC  4.29 M/mm3 (3.65-5.03)   22  05:27    


 


Hgb  13.5 gm/dl (11.8-15.2)   22  05:27    


 


Hct  41.1 % (35.5-45.6)   22  05:27    


 


MCV  96 fl (84-94)  H  22  05:27    


 


MCH  31 pg (28-32)   22  05:27    


 


MCHC  33 % (32-34)   22  05:27    


 


RDW  15.2 % (13.2-15.2)   22  05:27    


 


Plt Count  152 K/mm3 (140-440)   22  05:27    


 


Lymph % (Auto)  20.7 % (13.4-35.0)   22  05:27    


 


Mono % (Auto)  10.8 % (0.0-7.3)  H  22  05:27    


 


Eos % (Auto)  2.4 % (0.0-4.3)   22  05:27    


 


Baso % (Auto)  0.3 % (0.0-1.8)   22  05:27    


 


Lymph # (Auto)  1.0 K/mm3 (1.2-5.4)  L  22  05:27    


 


Mono # (Auto)  0.5 K/mm3 (0.0-0.8)   22  05:27    


 


Eos # (Auto)  0.1 K/mm3 (0.0-0.4)   22  05:27    


 


Baso # (Auto)  0.0 K/mm3 (0.0-0.1)   22  05:27    


 


Seg Neutrophils %  65.8 % (40.0-70.0)   22  05:27    


 


Seg Neutrophils #  3.1 K/mm3 (1.8-7.7)   22  05:27    


 


PT  42.7 Sec. (12.2-14.9)  H  22  23:38    


 


INR  3.80  (0.87-1.13)  H  22  23:38    


 


APTT  44.6 Sec. (24.2-36.6)  H  22  23:38    


 


Sodium  TNR   22  04:41    


 


Potassium  TNR   22  04:41    


 


Chloride  TNR   22  04:41    


 


Carbon Dioxide  TNR   22  04:41    


 


Anion Gap  TNR   22  04:41    


 


BUN  TNR   22  04:41    


 


Creatinine  TNR   22  04:41    


 


Estimated GFR  TNR   22  04:41    


 


BUN/Creatinine Ratio  TNR   22  04:41    


 


Glucose  TNR   22  04:41    


 


POC Glucose  70 mg/dL ()   22  08:04    


 


Calcium  TNR   22  04:41    


 


Total Bilirubin  0.50 mg/dL (0.1-1.2)   22  23:23    


 


Direct Bilirubin  < 0.2 mg/dL (0-0.2)   22  23:23    


 


Indirect Bilirubin  0.3 mg/dL  22  23:23    


 


AST  39 units/L (5-40)   22  23:23    


 


ALT  37 units/L (7-56)   22  23:23    


 


Alkaline Phosphatase  243 units/L ()  H  22  23:23    


 


Total Protein  8.3 g/dL (6.3-8.2)  H  22  23:23    


 


Albumin  3.8 g/dL (3.9-5)  L  22  23:23    


 


Albumin/Globulin Ratio  0.8 %  22  23:23    


 


Urine Color  Yellow  (Yellow)   22  00:02    


 


Urine Turbidity  Clear  (Clear)   22  00:02    


 


Urine pH  5.0  (5.0-7.0)   22  00:02    


 


Ur Specific Gravity  1.010  (1.003-1.030)   22  00:02    


 


Urine Protein  <15 mg/dl mg/dL (Negative)   22  00:02    


 


Urine Glucose (UA)  Neg mg/dL (Negative)   22  00:02    


 


Urine Ketones  Neg mg/dL (Negative)   22  00:02    


 


Urine Blood  Neg  (Negative)   22  00:02    


 


Urine Nitrite  Neg  (Negative)   22  00:02    


 


Ur Reducing Substances  Not Reportable   22  00:02    


 


Urine Bilirubin  Neg  (Negative)   22  00:02    


 


Urine Ictotest  Not Reportable   22  00:02    


 


Urine Urobilinogen  < 2.0 mg/dL (<2.0)   22  00:02    


 


Ur Leukocyte Esterase  Neg  (Negative)   22  00:02    


 


Urine WBC (Auto)  2.0 /HPF (0.0-6.0)   22  00:02    


 


Urine RBC (Auto)  1.0 /HPF (0.0-6.0)   22  00:02    


 


U Epithel Cells (Auto)  < 1.0 /HPF (0-13.0)   22  00:02    


 


Urine Bacteria (Auto)  1+ /HPF (Negative)   22  00:02    


 


Urine Mucus  Few /HPF  22  00:02    


 


Urine Yeast (Budding)  1+ /HPF  22  00:02    











Microbiology: 


Microbiology





22 00:10   Peripheral/Venous   Blood Culture - Preliminary


                            NO GROWTH AFTER 48 HOURS


22 23:23   Peripheral/Venous   Blood Culture - Preliminary


                            NO GROWTH AFTER 48 HOURS








[place]/IV: 


                                        





Voiding Method                   Urinal











Active Medications





- Current Medications


Current Medications: 














Generic Name Dose Route Start Last Admin





  Trade Name Freq  PRN Reason Stop Dose Admin


 


Acetaminophen  650 mg  22 03:36 





  Acetaminophen 325 Mg Tab  PO  





  Q4H PRN  





  Pain MILD(1-3)/Fever >100.5/HA  


 


Dextrose  0 ml  22 03:36  22 18:52





  Dextrose 50% In Water (25gm) 50 Ml Syringe  IV   25 ml





  Q30MIN PRN   Administration





  Hypoglycemia  





  Protocol  


 


Sodium Bicarbonate 150 meq/  1,150 mls @ 75 mls/hr  22 15:00  22 

00:44





  Dextrose  IV   75 mls/hr





  AS DIRECT RIN   Administration


 


Insulin Human Lispro  0 unit  22 07:30  22 08:51





  Insulin Lispro 100 Unit/Ml  SUB-Q   Not Given





  [facility]  





  Protocol  


 


Loperamide HCl  2 mg  22 17:29  22 08:52





  Loperamide 2 Mg Cap  PO   2 mg





  Q2H PRN   Administration





  Diarrhea  


 


Magnesium Hydroxide  30 ml  22 03:36 





  Magnesium Hydroxide (Mom) Oral Liqd Udc  PO  





  Q4H PRN  





  Constipation  


 


Morphine Sulfate  2 mg  22 03:36 





  Morphine 2 Mg/1 Ml Inj  IV  





  Q4H PRN  





  Pain, Moderate (4-6)  


 


Morphine Sulfate  4 mg  22 03:36 





  Morphine 4 Mg/1 Ml Inj  IV  





  Q4H PRN  





  Pain , Severe (7-10)  


 


Ondansetron HCl  4 mg  22 03:36  22 08:52





  Ondansetron 4 Mg/2 Ml Inj  IV   4 mg





  Q8H PRN   Administration





  Nausea And Vomiting  


 


Sodium Chloride  10 ml  22 10:00  22 22:00





  Sodium Chloride 0.9% 10 Ml Flush Syringe  IV   10 ml





  BID RIN   Administration


 


Sodium Chloride  10 ml  22 03:36 





  Sodium Chloride 0.9% 10 Ml Flush Syringe  IV  





  PRN PRN  





  LINE FLUSH  














Nutrition/Malnutrition Assess





- Dietary Evaluation


Nutrition/Malnutrition Findings: 


                                        





Nutrition Notes                                            Start:  22 

12:30


Freq:                                                      Status: Active       




Protocol:                                                                       




 Document     22 12:30  ANDREW  (Rec: 22 12:37  [facility]  [id number])


 Nutrition Notes


     Need for Assessment generated from:         MD Order,Education


     Initial or Follow up                        Brief Note


     Current Diagnosis                           Acute Kidney Injury,Diabetes


     Other Pertinent Diagnosis                   Diarrhea


     Current Diet                                Cardiac/Consistent CHO


     Labs/Tests                                  ():


                                                 K 5.9


                                                 BUN 83


                                                 Cr 8.2


     Height                                      5 ft 10 in


     Weight                                      90.7 kg


     Ideal Body Weight (kg)                      75.45


     BMI                                         28.7


     Weight Status                               Overweight


     Subjective/Other Information                RD consulted for diet


                                                 education.  BP and BG labs


                                                 controlled, however, renal


                                                 labs elevated.  Nephrology


                                                 consulted; NICOLASA most likely R/T


                                                 severe volume depletion.  Pt


                                                 receiving IVF to correct.


                                                 Will monitor renal function.


     Burn                                        Absent


     Trauma                                      Absent


     GI Symptoms                                 Diarrhea


     Minimum of two criteria                     No


     Is patient on ventilator?                   No


     Is Patient Ambulatory and/or Out of Bed     No


     [facility])      [phone]


     Calculation Used for Recommendations        [facility]


     Additional Notes                            Pro needs 0.8-1.2g/k-109g


                                                 /day


                                                 Fluid needs 1ml/kcal


 Nutrition Intervention


     Follow-Up By:                               06/15/22


     Additional Comments                         F/U: intakes, renal function,


                                                 diet education needs

## 2022-06-14 NOTE — EVENT NOTE
Date: 06/14/22


Called [facility] physician at [phone] to discuss about patient's condition and

treatment plan


However [place] physicians are busy and unable to contact left a voicemail, left 

a callback number.


I will try again in half an hour.

## 2022-06-15 LAB
BUN SERPL-MCNC: 52 MG/DL (ref 9–20)
BUN SERPL-MCNC: 56 MG/DL (ref 9–20)
BUN/CREAT SERPL: 13 %
BUN/CREAT SERPL: 14 %
CALCIUM SERPL-MCNC: 8.3 MG/DL (ref 8.4–10.2)
CALCIUM SERPL-MCNC: 8.5 MG/DL (ref 8.4–10.2)
CREATININE,URINE: 117.7 MG/DL (ref 0.1–20)
HEMOLYSIS INDEX: 0
HEMOLYSIS INDEX: 33

## 2022-06-15 RX ADMIN — INSULIN LISPRO SCH: 100 INJECTION, SOLUTION INTRAVENOUS; SUBCUTANEOUS at 13:46

## 2022-06-15 RX ADMIN — INSULIN LISPRO SCH: 100 INJECTION, SOLUTION INTRAVENOUS; SUBCUTANEOUS at 18:01

## 2022-06-15 RX ADMIN — Medication SCH ML: at 21:25

## 2022-06-15 RX ADMIN — DEXTROSE SCH MLS/HR: 10 SOLUTION INTRAVENOUS at 10:04

## 2022-06-15 RX ADMIN — SODIUM BICARBONATE SCH MG: 650 TABLET ORAL at 20:19

## 2022-06-15 RX ADMIN — INSULIN LISPRO SCH: 100 INJECTION, SOLUTION INTRAVENOUS; SUBCUTANEOUS at 08:26

## 2022-06-15 RX ADMIN — SODIUM BICARBONATE SCH MG: 650 TABLET ORAL at 15:20

## 2022-06-15 RX ADMIN — Medication SCH ML: at 10:03

## 2022-06-15 RX ADMIN — INSULIN LISPRO SCH: 100 INJECTION, SOLUTION INTRAVENOUS; SUBCUTANEOUS at 08:12

## 2022-06-15 RX ADMIN — INSULIN LISPRO SCH: 100 INJECTION, SOLUTION INTRAVENOUS; SUBCUTANEOUS at 21:25

## 2022-06-15 NOTE — PROGRESS NOTE
Assessment and Plan





Impression:


* Acute kidney injury secondary volume depletion on chronic kidney disease


   --Baseline renal function unknown; followed by [place]


   --UA sediment bland


* Hyperkalemia, severe - resolved


* Metabolic acidosis


* Diarrhea


* Hypoglycemia


* Cyst of kidney, acquired


   --Renal ultrasound (June 13): nml echogenicity; 1cm exophytic cyst, right 

kidney





Plan:


* No acute indication for renal replacement therapy at this time


* Kayexelate 30g


* Renal diet to restrict dietary K


* Starb po bicarbonate


* D5W gtt per primary team


* Dose medications for renal function


* Avoid potential nephrotoxins


* Strict I/O


* AM labs





Subjective


Date of service: 06/15/22


Interval history: 





Patient has no complaints today.  





Objective





- Vital Signs


Vital signs: 


                               Vital Signs - 12hr











  06/14/22 06/15/22 06/15/22





  22:00 00:26 00:28


 


Temperature  99 F 


 


Pulse Rate  68 


 


Respiratory  16 16





Rate   


 


Blood Pressure   135/79


 


Blood Pressure  135/79 





[Right]   


 


O2 Sat by Pulse 98 99 





Oximetry   














  06/15/22 06/15/22





  00:53 04:37


 


Temperature  99.2 F


 


Pulse Rate 84 68


 


Respiratory  18





Rate  


 


Blood Pressure  130/76


 


Blood Pressure  





[Right]  


 


O2 Sat by Pulse  95





Oximetry  














- General Appearance


General appearance: well-developed, well-nourished


EENT: ATNC


Respiratory: Present: Clear to Ascultation


Cardiology: regular, S1S2


Gastrointestinal: normal, no tenderness, no distended


Integumentary: warm and dry


Neurologic: alert and oriented x3





- Lab





                                 06/13/22 05:27





                                 06/16/22 04:35


                             Most recent lab results











Calcium  8.3 mg/dL (8.4-10.2)  L  06/15/22  04:57    


 


Magnesium  1.50 mg/dL (1.7-2.3)  L  06/15/22  04:57    














Medications & Allergies





- Medications


Allergies/Adverse Reactions: 


                                    Allergies





No Known Allergies Allergy (Unverified 06/11/22 23:05)


   








Home Medications: 


                                Home Medications











 Medication  Instructions  Recorded  Confirmed  Last Taken  Type


 


Furosemide [Lasix] 40 mg PO DAILY 06/15/22 06/15/22 06/11/22 History


 


Gabapentin [Neurontin] 600 mg PO Q8H 06/15/22 06/15/22 06/11/22 History


 


Losartan [Cozaar] 100 mg PO QDAY 06/15/22 06/15/22 06/11/22 History


 


Metoprolol Xl [Metoprolol 50 mg PO QDAY 06/15/22 06/15/22 06/11/22 History





SUCCINATE ER TAB]     


 


Pantoprazole [Protonix] 40 mg PO QDAY 06/15/22 06/15/22 06/11/22 History


 


Potassium Chloride [K-Dur] 10 meq PO QDAY 06/15/22 06/15/22 06/11/22 History


 


amLODIPine [Norvasc] 10 mg PO DAILY 06/15/22 06/15/22 06/11/22 History


 


cilostazoL [Pletal] 50 mg PO BID 06/15/22 06/15/22 06/11/22 History











Active Medications: 














Generic Name Dose Route Start Last Admin





  Trade Name Freq  PRN Reason Stop Dose Admin


 


Acetaminophen  650 mg  06/12/22 03:36 





  Acetaminophen 325 Mg Tab  PO  





  Q4H PRN  





  Pain MILD(1-3)/Fever >100.5/HA  


 


Dextrose  0 ml  06/12/22 03:36  06/14/22 15:47





  Dextrose 50% In Water (25gm) 50 Ml Syringe  IV   50 ml





  Q30MIN PRN   Administration





  Hypoglycemia  





  Protocol  


 


Sodium Bicarbonate 150 meq/  1,150 mls @ 75 mls/hr  06/12/22 15:00  06/14/22 

19:15





  Dextrose  IV   0 mls/hr





  AS DIRECT RIN   Infusion


 


Dextrose  1,000 mls @ 75 mls/hr  06/14/22 17:00  06/14/22 17:12





  D10w  IV   75 mls/hr





  AS DIRECT RIN   Administration


 


Magnesium Sulfate  2 gm in 50 mls @ 25 mls/hr  06/15/22 09:30 





  Magnesium Sulfate 2gm/50ml  IV  06/15/22 11:29 





  ONCE ONE  


 


Insulin Human Lispro  0 unit  06/12/22 07:30  06/15/22 08:26





  Insulin Lispro 100 Unit/Ml  SUB-Q   Not Given





  [facility]HS [facility]  





  Protocol  


 


Loperamide HCl  2 mg  06/12/22 17:29  06/14/22 17:45





  Loperamide 2 Mg Cap  PO   2 mg





  Q2H PRN   Administration





  Diarrhea  


 


Magnesium Hydroxide  30 ml  06/12/22 03:36 





  Magnesium Hydroxide (Mom) Oral Liqd Udc  PO  





  Q4H PRN  





  Constipation  


 


Morphine Sulfate  2 mg  06/12/22 03:36 





  Morphine 2 Mg/1 Ml Inj  IV  





  Q4H PRN  





  Pain, Moderate (4-6)  


 


Morphine Sulfate  4 mg  06/12/22 03:36 





  Morphine 4 Mg/1 Ml Inj  IV  





  Q4H PRN  





  Pain , Severe (7-10)  


 


Ondansetron HCl  4 mg  06/12/22 03:36  06/14/22 08:52





  Ondansetron 4 Mg/2 Ml Inj  IV   4 mg





  Q8H PRN   Administration





  Nausea And Vomiting  


 


Sodium Chloride  10 ml  06/12/22 10:00  06/14/22 22:20





  Sodium Chloride 0.9% 10 Ml Flush Syringe  IV   10 ml





  BID RIN   Administration


 


Sodium Chloride  10 ml  06/12/22 03:36 





  Sodium Chloride 0.9% 10 Ml Flush Syringe  IV  





  PRN PRN  





  LINE FLUSH  


 


Sodium Polystyrene Sulfonate  30 gm  06/15/22 09:43 





  Sodium Polystyrene 15 Gm/60 Ml Oral Liqd  PO  06/15/22 09:44 





  ONCE ONE

## 2022-06-15 NOTE — PROGRESS NOTE
Assessment and Plan


Assessment and plan: 


77-year-old male patient with known history of atrial fibrillation presents to 

the emergency room via EMS for evaluation of abnormal labs done at the [facility] urgent care facility.  Patient had gone to the urgent care facility 

with a complaint of diarrhea which has been ongoing for about a week.  Labs done

at the urgent care facility reveals elevated potassium level of 7.2 and 

creatinine of 8.3.  


Patient was given insulin and glucose and nebulizing treatment at the facility 

and sent to [facility] ER for further evaluation and management.





Assessment:


--Acute renal failure likely ATN with history of underlying CKD unknown baseline

renal function monitor renal function


Monitor renal function, avoid nephrotoxin, nephrology following





-- Severe hyperkalemia;


Corrected per protocol, follow electrolytes





--Episodes of hypoglycemia;


Encourage the patient to have increased oral nutrition


D50 as needed, also start D5W IV fluids closely monitor blood sugars


Check hemoglobin A1c





--Diarrhea-etiology unclear;


Mild improvement, check C. difficile


IV fluids and supportive care





-- History of A. fib-rate controlled;


Patient is in sinus rhythm with multiple PVCs


Continue supportive care





Plan:


6/12/22: 


A repeat test done in the emergency room showed a potassium level of 5.9 and 

creatinine of 8.2 with a BUN of 83.


EKG was unremarkable.


Nephrologist on-call was immediately consulted by the ER physician and patient 

also admitted to the hospital for further evaluation and management


 with acute kidney failure, hyperkalemia and diarrhea.


Today patient continued to have diarrhea, repeat potassium 7.0, bicarb ordered 

5, creatinine 7.3 and BUN 76


Ordered for the sodium bicarbonate drip, ordered another 5 units of insulin, 2 g

of calcium gluconate


Ordered CT chest for diarrhea, and loperamide


Continue to follow BMP, guarded prognosis


Patient denies any chest pain or shortness of breath, vitals noted and stable


Avoid nephrotoxins





6/13/22; 


Continue sodium bicarbonate drip


Potassium 5.1 today With creatinine 6.2


Avoid nephrotoxins, follow BMP


Discussed with nephrologist and no plan for renal replacement therapy at this 

time


Order C. difficile study yesterday -unlikely to be positive as patient does not 

appear to be toxic from underlying infection


Added loperamide as needed for diarrhea





6/14/2022; patient has episodes of hypoglycemia


D5W, IV D50, increase oral intake of sugars/orange juice


Check A1c, closely monitor


Patient did not have blood work done since morning due to some contamination of 

the sample per lab


I discussed with patient's nurse, charge nurse as well as with the lab personal,

ordered stat blood work


Follow closely





Disposition; monitor closely, follow consultants recommendations


6/14/2022 tried to call [place] physician, unable to contact a physician due to 

busy schedules there, left a voicemail


Encouraged him to call back to discuss patient's condition and treatment plan





Plan of care reviewed with the patient and his nurse





6/15/2022; 


I called [place] physician today evening and discussed in detail patient's 

condition tests and reports treatment plan with  


Answered all her questions, she advised to continue current management


Possible discharge tomorrow











History


Interval history: 


I have seen and examined the patient at the bedside


Patient's chart and medications reviewed


Patient feels slightly better


Blood sugars mild improvement with D10


Multiple electrolyte abnormalities


Creatinine trending down


Vital signs noted








Hospitalist Physical





- Constitutional


Vitals: 


                                        











Temp Pulse Resp BP Pulse Ox


 


 99.2 F   70   18   130/76   96 


 


 06/15/22 04:37  06/15/22 10:00  06/15/22 04:37  06/15/22 04:37  06/15/22 19:32











General appearance: Present: no acute distress, well-nourished





- EENT


Eyes: Present: PERRL, EOM intact





- Neck


Neck: Present: supple, normal ROM





- Respiratory


Respiratory effort: normal


Respiratory: bilateral: diminished, negative: rales, rhonchi, wheezing





- Cardiovascular


Rhythm: regular


Heart Sounds: Present: S1 & S2





- Extremities


Extremities: no ischemia, No edema





- Abdominal


General gastrointestinal: soft, non-tender, non-distended, normal bowel sounds





- Integumentary


Integumentary: Present: clear, warm





- Psychiatric


Psychiatric: appropriate mood/affect, cooperative





- Neurologic


Neurologic: CNII-XII intact, moves all extremities





Results





- Labs


CBC & Chem 7: 


                                 06/13/22 05:27





                                 06/15/22 15:13


Labs: 


                             Laboratory Last Values











WBC  4.8 K/mm3 (4.5-11.0)   06/13/22  05:27    


 


RBC  4.29 M/mm3 (3.65-5.03)   06/13/22  05:27    


 


Hgb  13.5 gm/dl (11.8-15.2)   06/13/22  05:27    


 


Hct  41.1 % (35.5-45.6)   06/13/22  05:27    


 


MCV  96 fl (84-94)  H  06/13/22  05:27    


 


MCH  31 pg (28-32)   06/13/22  05:27    


 


MCHC  33 % (32-34)   06/13/22  05:27    


 


RDW  15.2 % (13.2-15.2)   06/13/22  05:27    


 


Plt Count  152 K/mm3 (140-440)   06/13/22  05:27    


 


Lymph % (Auto)  20.7 % (13.4-35.0)   06/13/22  05:27    


 


Mono % (Auto)  10.8 % (0.0-7.3)  H  06/13/22  05:27    


 


Eos % (Auto)  2.4 % (0.0-4.3)   06/13/22  05:27    


 


Baso % (Auto)  0.3 % (0.0-1.8)   06/13/22  05:27    


 


Lymph # (Auto)  1.0 K/mm3 (1.2-5.4)  L  06/13/22  05:27    


 


Mono # (Auto)  0.5 K/mm3 (0.0-0.8)   06/13/22  05:27    


 


Eos # (Auto)  0.1 K/mm3 (0.0-0.4)   06/13/22  05:27    


 


Baso # (Auto)  0.0 K/mm3 (0.0-0.1)   06/13/22  05:27    


 


Seg Neutrophils %  65.8 % (40.0-70.0)   06/13/22  05:27    


 


Seg Neutrophils #  3.1 K/mm3 (1.8-7.7)   06/13/22  05:27    


 


PT  42.7 Sec. (12.2-14.9)  H  06/11/22  23:38    


 


INR  3.80  (0.87-1.13)  H  06/11/22  23:38    


 


APTT  44.6 Sec. (24.2-36.6)  H  06/11/22  23:38    


 


Sodium  132 mmol/L (137-145)  L  06/15/22  15:13    


 


Potassium  5.3 mmol/L (3.6-5.0)  H  06/15/22  15:13    


 


Chloride  100.5 mmol/L ()   06/15/22  15:13    


 


Carbon Dioxide  20 mmol/L (22-30)  L  06/15/22  15:13    


 


Anion Gap  17 mmol/L  06/15/22  15:13    


 


BUN  52 mg/dL (9-20)  H  06/15/22  15:13    


 


Creatinine  3.9 mg/dL (0.8-1.3)  H  06/15/22  15:13    


 


Estimated GFR  18 ml/min  06/15/22  15:13    


 


BUN/Creatinine Ratio  13 %  06/15/22  15:13    


 


Glucose  116 mg/dL ()  H  06/15/22  15:13    


 


POC Glucose  159 mg/dL ()  H  06/15/22  17:13    


 


Hemoglobin A1c  6.4 % (4-6)  H  06/14/22  18:48    


 


Calcium  8.5 mg/dL (8.4-10.2)   06/15/22  15:13    


 


Magnesium  1.50 mg/dL (1.7-2.3)  L  06/15/22  04:57    


 


Total Bilirubin  0.50 mg/dL (0.1-1.2)   06/11/22  23:23    


 


Direct Bilirubin  < 0.2 mg/dL (0-0.2)   06/11/22  23:23    


 


Indirect Bilirubin  0.3 mg/dL  06/11/22  23:23    


 


AST  39 units/L (5-40)   06/11/22  23:23    


 


ALT  37 units/L (7-56)   06/11/22  23:23    


 


Alkaline Phosphatase  243 units/L ()  H  06/11/22  23:23    


 


Total Protein  8.3 g/dL (6.3-8.2)  H  06/11/22  23:23    


 


Albumin  3.8 g/dL (3.9-5)  L  06/11/22  23:23    


 


Albumin/Globulin Ratio  0.8 %  06/11/22  23:23    


 


Urine Color  Yellow  (Yellow)   06/14/22  00:02    


 


Urine Turbidity  Clear  (Clear)   06/14/22  00:02    


 


Urine pH  5.0  (5.0-7.0)   06/14/22  00:02    


 


Ur Specific Gravity  1.010  (1.003-1.030)   06/14/22  00:02    


 


Urine Protein  <15 mg/dl mg/dL (Negative)   06/14/22  00:02    


 


Urine Glucose (UA)  Neg mg/dL (Negative)   06/14/22  00:02    


 


Urine Ketones  Neg mg/dL (Negative)   06/14/22  00:02    


 


Urine Blood  Neg  (Negative)   06/14/22  00:02    


 


Urine Nitrite  Neg  (Negative)   06/14/22  00:02    


 


Ur Reducing Substances  Not Reportable   06/14/22  00:02    


 


Urine Bilirubin  Neg  (Negative)   06/14/22  00:02    


 


Urine Ictotest  Not Reportable   06/14/22  00:02    


 


Urine Urobilinogen  < 2.0 mg/dL (<2.0)   06/14/22  00:02    


 


Ur Leukocyte Esterase  Neg  (Negative)   06/14/22  00:02    


 


Urine WBC (Auto)  2.0 /HPF (0.0-6.0)   06/14/22  00:02    


 


Urine RBC (Auto)  1.0 /HPF (0.0-6.0)   06/14/22  00:02    


 


U Epithel Cells (Auto)  < 1.0 /HPF (0-13.0)   06/14/22  00:02    


 


Urine Bacteria (Auto)  1+ /HPF (Negative)   06/14/22  00:02    


 


Urine Mucus  Few /HPF  06/14/22  00:02    


 


Urine Yeast (Budding)  1+ /HPF  06/14/22  00:02    


 


Urine Creatinine  117.7 mg/dL (0.1-20.0)  H  06/14/22  03:00    


 


Urine Sodium  66 mmol/L  06/14/22  03:00    











Microbiology: 


Microbiology





06/11/22 23:23   Peripheral/Venous   Blood Culture - Preliminary


                            NO GROWTH AFTER 72 HOURS


06/12/22 00:10   Peripheral/Venous   Blood Culture - Preliminary


                            NO GROWTH AFTER 72 HOURS








[place]/IV: 


                                        





Voiding Method                   Urinal











Active Medications





- Current Medications


Current Medications: 














Generic Name Dose Route Start Last Admin





  Trade Name Freq  PRN Reason Stop Dose Admin


 


Acetaminophen  650 mg  06/12/22 03:36 





  Acetaminophen 325 Mg Tab  PO  





  Q4H PRN  





  Pain MILD(1-3)/Fever >100.5/HA  


 


Dextrose  0 ml  06/12/22 03:36  06/14/22 15:47





  Dextrose 50% In Water (25gm) 50 Ml Syringe  IV   50 ml





  Q30MIN PRN   Administration





  Hypoglycemia  





  Protocol  


 


Dextrose  1,000 mls @ 75 mls/hr  06/14/22 17:00  06/15/22 10:04





  D10w  IV   75 mls/hr





  AS DIRECT RIN   Administration


 


Insulin Human Lispro  0 unit  06/12/22 07:30  06/15/22 18:01





  Insulin Lispro 100 Unit/Ml  SUB-Q   Not Given





  [facility]  





  Protocol  


 


Loperamide HCl  2 mg  06/12/22 17:29  06/14/22 17:45





  Loperamide 2 Mg Cap  PO   2 mg





  Q2H PRN   Administration





  Diarrhea  


 


Magnesium Hydroxide  30 ml  06/12/22 03:36 





  Magnesium Hydroxide (Mom) Oral Liqd Udc  PO  





  Q4H PRN  





  Constipation  


 


Morphine Sulfate  2 mg  06/12/22 03:36 





  Morphine 2 Mg/1 Ml Inj  IV  





  Q4H PRN  





  Pain, Moderate (4-6)  


 


Morphine Sulfate  4 mg  06/12/22 03:36 





  Morphine 4 Mg/1 Ml Inj  IV  





  Q4H PRN  





  Pain , Severe (7-10)  


 


Ondansetron HCl  4 mg  06/12/22 03:36  06/14/22 08:52





  Ondansetron 4 Mg/2 Ml Inj  IV   4 mg





  Q8H PRN   Administration





  Nausea And Vomiting  


 


Sodium Bicarbonate  1,300 mg  06/15/22 14:00  06/15/22 15:20





  Sodium Bicarbonate 650 Mg Tab  PO   1,300 mg





  TID RIN   Administration


 


Sodium Chloride  10 ml  06/12/22 10:00  06/15/22 10:03





  Sodium Chloride 0.9% 10 Ml Flush Syringe  IV   10 ml





  BID RIN   Administration


 


Sodium Chloride  10 ml  06/12/22 03:36 





  Sodium Chloride 0.9% 10 Ml Flush Syringe  IV  





  PRN PRN  





  LINE FLUSH  














Nutrition/Malnutrition Assess





- Dietary Evaluation


Nutrition/Malnutrition Findings: 


                                        





Nutrition Notes                                            Start:  06/12/22 

12:30


Freq:                                                      Status: Active       




Protocol:                                                                       




 Document     06/15/22 12:47  ROHAN  (Rec: 06/15/22 13:13  ROHAN  [id number])


 Nutrition Notes


     Initial or Follow up                        Assessment


     Current Diagnosis                           Acute Kidney Injury,CKD(stage


                                                 I-IV)


     Other Pertinent Diagnosis                   Diarrhea, Hyperkalemia,


                                                 Asimptomatic Hypoglycemia,


                                                 Kidney Cyst.


     Current Diet                                Renal Diet (since L 06/15).


     Labs/Tests                                  06/15: Na 135, K 5.7, CO2 17,


                                                 BUN 56, Crea 4.1, Ca 8.3, Mg 1


                                                 .5.


     Pertinent Medications                       06/15: D10w 1000ml @ 75ml/hr,


                                                 others nutritionally


                                                 unremarkable.


     Height                                      5 ft 10 in


     Weight                                      88.8 kg


     Ideal Body Weight (kg)                      75.45


     BMI                                         28.0


     Intake Prior to Admission                   Good


     Weight change and time frame                Pt denies having loss body


                                                 weight PTA.


                                                 1.9 Kg body weigh loss in 3


                                                 days reported.


     Weight Status                               Overweight


     Subjective/Other Information                RD consult for routine F/U on


                                                 dietrary advancement.


                                                 Pt's PO intake of meals has


                                                 been Good(75%) and well


                                                 tolerated, according to ADL


                                                 notes.


                                                 Pt is on Room Air, O2


                                                 saturation @ 96%, according to


                                                 Physical Assessment History


                                                 notes.


                                                 Pt has missing teeth,


                                                 according to Physical


                                                 Assessment History notes.


                                                 Pt continues to present


                                                 diarrhea, according to


                                                 Physical Assessment History


                                                 notes.


                                                 Pt still in critical condition


                                                 , not a candidate for


                                                 Nutrition Education at the


                                                 time, will assess feasibility


                                                 on F/U.


     Percent of energy/protein needs met:        Prescribed Renal Diet provides


                                                 for energy/protein needs (2,


                                                 072 Kcal/77 g) during LOS.


     Burn                                        Absent


     Trauma                                      Absent


     GI Symptoms                                 Diarrhea


     Food Allergy                                No


     Skin Integrity/Comment                      Assessment WNL.


     Current % PO                                Good (%)


     Minimum of two criteria                     No


     Fluid Accumulation                          N/A


     Reduced  Strength                       N/A (non-severe)


     Protein-Calorie Malnutrition                N\A


     #1


      Nutrition Diagnosis                        No nutrition diagnosis at this


                                                 time


     Is patient on ventilator?                   No


     Is Patient Ambulatory and/or Out of Bed     No


     REE-([facility]-confined to bed)      [phone]


     Calculation Used for Recommendations        [facility]


     Additional Notes                            Protein: 0.8-1.2 g/Kg ABW; 71-


                                                 107 g/day.


                                                 Fluids: 1 ml/Kcal, or as per


                                                 MD.


 Nutrition Intervention


     Change Diet Order:                          Continue Renal Diet.


     Follow-Up By:                               06/22/22


     Additional Comments                         Nutrition education will be


                                                 provided at F/U, if feasible.


                                                 Continue monitoring food


                                                 tolerance, %PO intake of meals


                                                 , and BM.

## 2022-06-16 LAB
BUN SERPL-MCNC: 51 MG/DL (ref 9–20)
BUN/CREAT SERPL: 14 %
CALCIUM SERPL-MCNC: 8.5 MG/DL (ref 8.4–10.2)
HEMOLYSIS INDEX: 2

## 2022-06-16 RX ADMIN — INSULIN LISPRO SCH: 100 INJECTION, SOLUTION INTRAVENOUS; SUBCUTANEOUS at 22:18

## 2022-06-16 RX ADMIN — INSULIN LISPRO SCH: 100 INJECTION, SOLUTION INTRAVENOUS; SUBCUTANEOUS at 08:09

## 2022-06-16 RX ADMIN — INSULIN LISPRO SCH: 100 INJECTION, SOLUTION INTRAVENOUS; SUBCUTANEOUS at 13:52

## 2022-06-16 RX ADMIN — Medication SCH ML: at 22:14

## 2022-06-16 RX ADMIN — Medication SCH ML: at 10:22

## 2022-06-16 RX ADMIN — SODIUM BICARBONATE SCH MG: 650 TABLET ORAL at 16:04

## 2022-06-16 RX ADMIN — INSULIN LISPRO SCH: 100 INJECTION, SOLUTION INTRAVENOUS; SUBCUTANEOUS at 17:04

## 2022-06-16 RX ADMIN — SODIUM BICARBONATE SCH MG: 650 TABLET ORAL at 22:14

## 2022-06-16 RX ADMIN — SODIUM BICARBONATE SCH MG: 650 TABLET ORAL at 10:21

## 2022-06-16 RX ADMIN — DEXTROSE SCH MLS/HR: 10 SOLUTION INTRAVENOUS at 04:57

## 2022-06-16 NOTE — PROGRESS NOTE
Assessment and Plan


Assessment and plan: 


77-year-old male patient with known history of atrial fibrillation presents to 

the emergency room via EMS for evaluation of abnormal labs done at the [facility] urgent care facility.  Patient had gone to the urgent care facility 

with a complaint of diarrhea which has been ongoing for about a week.  Labs done

at the urgent care facility reveals elevated potassium level of 7.2 and 

creatinine of 8.3.  


Patient was given insulin and glucose and nebulizing treatment at the facility 

and sent to [facility] ER for further evaluation and management.





Assessment:


--Acute renal failure likely ATN with history of underlying CKD nephrology 

following


Creatinine trending down from 8.2-3.7 today





-- Severe hyperkalemia; improved


Corrected per protocol, follow electrolytes





--Episodes of hypoglycemia; slightly improved


D50 as needed, patient received D10 W


Check hemoglobin A1c 6.4





--Diarrhea-etiology unclear;


Mild improvement, check C. difficile


IV fluids and supportive care





-- History of A. fib-rate controlled;


Patient is in sinus rhythm with multiple PVCs


Continue supportive care





Plan:


6/12/22: 


A repeat test done in the emergency room showed a potassium level of 5.9 and 

creatinine of 8.2 with a BUN of 83.


EKG was unremarkable.


Nephrologist on-call was immediately consulted by the ER physician and patient 

also admitted to the hospital for further evaluation and management


 with acute kidney failure, hyperkalemia and diarrhea.


Today patient continued to have diarrhea, repeat potassium 7.0, bicarb ordered 

5, creatinine 7.3 and BUN 76


Ordered for the sodium bicarbonate drip, ordered another 5 units of insulin, 2 g

of calcium gluconate


Ordered CT chest for diarrhea, and loperamide


Continue to follow BMP, guarded prognosis


Patient denies any chest pain or shortness of breath, vitals noted and stable


Avoid nephrotoxins





6/13/22; 


Continue sodium bicarbonate drip


Potassium 5.1 today With creatinine 6.2


Avoid nephrotoxins, follow BMP


Discussed with nephrologist and no plan for renal replacement therapy at this 

time


Order C. difficile study yesterday -unlikely to be positive as patient does not 

appear to be toxic from underlying infection


Added loperamide as needed for diarrhea





6/14/2022; patient has episodes of hypoglycemia


D5W, IV D50, increase oral intake of sugars/orange juice


Check A1c, closely monitor


Patient did not have blood work done since morning due to some contamination of 

the sample per lab


I discussed with patient's nurse, charge nurse as well as with the lab personal,

ordered stat blood work


Follow closely





Disposition; monitor closely, follow consultants recommendations


6/14/2022 tried to call [facility] physician, unable to contact a physician due to 

busy schedules there, left a voicemail


Encouraged him to call back to discuss patient's condition and treatment plan





Plan of care reviewed with the patient and his nurse





6/15/2022; 


I called [facility] physician today evening and discussed in detail patient's 

condition tests and reports treatment plan with  


Answered all her questions, she advised to continue current management


Possible discharge tomorrow





6/16/2022; discontinued D10 W fluids


Blood sugars reasonable, creatinine 3.7


Mild hypomagnesemia,


Possible discharge home tomorrow if stable

















History


Interval history: 


I have seen and examined the patient at the bedside


Patient's chart and medications reviewed


Patient feels slightly better


Blood sugars lower normal


Vital signs noted








Hospitalist Physical





- Constitutional


Vitals: 


                                        











Temp Pulse Resp BP Pulse Ox


 


 98.4 F   79   18   129/85   96 


 


 06/16/22 15:17  06/16/22 10:00  06/16/22 15:17  06/16/22 15:17  06/16/22 10:00











General appearance: Present: no acute distress, well-nourished





- EENT


Eyes: Present: PERRL, EOM intact





- Neck


Neck: Present: supple, normal ROM





- Respiratory


Respiratory effort: normal


Respiratory: bilateral: diminished, negative: rales, rhonchi, wheezing





- Cardiovascular


Rhythm: regular


Heart Sounds: Present: S1 & S2





- Extremities


Extremities: no ischemia, No edema





- Abdominal


General gastrointestinal: soft, non-tender, non-distended, normal bowel sounds





- Integumentary


Integumentary: Present: clear, warm





- Psychiatric


Psychiatric: appropriate mood/affect, cooperative





- Neurologic


Neurologic: CNII-XII intact, moves all extremities





Results





- Labs


CBC & Chem 7: 


                                 06/13/22 05:27





                                 06/16/22 04:35


Labs: 


                             Laboratory Last Values











WBC  4.8 K/mm3 (4.5-11.0)   06/13/22  05:27    


 


RBC  4.29 M/mm3 (3.65-5.03)   06/13/22  05:27    


 


Hgb  13.5 gm/dl (11.8-15.2)   06/13/22  05:27    


 


Hct  41.1 % (35.5-45.6)   06/13/22  05:27    


 


MCV  96 fl (84-94)  H  06/13/22  05:27    


 


MCH  31 pg (28-32)   06/13/22  05:27    


 


MCHC  33 % (32-34)   06/13/22  05:27    


 


RDW  15.2 % (13.2-15.2)   06/13/22  05:27    


 


Plt Count  152 K/mm3 (140-440)   06/13/22  05:27    


 


Lymph % (Auto)  20.7 % (13.4-35.0)   06/13/22  05:27    


 


Mono % (Auto)  10.8 % (0.0-7.3)  H  06/13/22  05:27    


 


Eos % (Auto)  2.4 % (0.0-4.3)   06/13/22  05:27    


 


Baso % (Auto)  0.3 % (0.0-1.8)   06/13/22  05:27    


 


Lymph # (Auto)  1.0 K/mm3 (1.2-5.4)  L  06/13/22  05:27    


 


Mono # (Auto)  0.5 K/mm3 (0.0-0.8)   06/13/22  05:27    


 


Eos # (Auto)  0.1 K/mm3 (0.0-0.4)   06/13/22  05:27    


 


Baso # (Auto)  0.0 K/mm3 (0.0-0.1)   06/13/22  05:27    


 


Seg Neutrophils %  65.8 % (40.0-70.0)   06/13/22  05:27    


 


Seg Neutrophils #  3.1 K/mm3 (1.8-7.7)   06/13/22  05:27    


 


PT  42.7 Sec. (12.2-14.9)  H  06/11/22  23:38    


 


INR  3.80  (0.87-1.13)  H  06/11/22  23:38    


 


APTT  44.6 Sec. (24.2-36.6)  H  06/11/22  23:38    


 


Sodium  132 mmol/L (137-145)  L  06/16/22  04:35    


 


Potassium  5.1 mmol/L (3.6-5.0)  H  06/16/22  04:35    


 


Chloride  101.7 mmol/L ()   06/16/22  04:35    


 


Carbon Dioxide  19 mmol/L (22-30)  L  06/16/22  04:35    


 


Anion Gap  16 mmol/L  06/16/22  04:35    


 


BUN  51 mg/dL (9-20)  H  06/16/22  04:35    


 


Creatinine  3.7 mg/dL (0.8-1.3)  H  06/16/22  04:35    


 


Estimated GFR  19 ml/min  06/16/22  04:35    


 


BUN/Creatinine Ratio  14 %  06/16/22  04:35    


 


Glucose  105 mg/dL ()  H  06/16/22  04:35    


 


POC Glucose  108 mg/dL ()  H  06/16/22  16:26    


 


Hemoglobin A1c  6.4 % (4-6)  H  06/14/22  18:48    


 


Calcium  8.5 mg/dL (8.4-10.2)   06/16/22  04:35    


 


Magnesium  1.60 mg/dL (1.7-2.3)  L  06/16/22  04:35    


 


Total Bilirubin  0.50 mg/dL (0.1-1.2)   06/11/22  23:23    


 


Direct Bilirubin  < 0.2 mg/dL (0-0.2)   06/11/22  23:23    


 


Indirect Bilirubin  0.3 mg/dL  06/11/22  23:23    


 


AST  39 units/L (5-40)   06/11/22  23:23    


 


ALT  37 units/L (7-56)   06/11/22  23:23    


 


Alkaline Phosphatase  243 units/L ()  H  06/11/22  23:23    


 


Total Protein  8.3 g/dL (6.3-8.2)  H  06/11/22  23:23    


 


Albumin  3.8 g/dL (3.9-5)  L  06/11/22  23:23    


 


Albumin/Globulin Ratio  0.8 %  06/11/22  23:23    


 


Urine Color  Yellow  (Yellow)   06/14/22  00:02    


 


Urine Turbidity  Clear  (Clear)   06/14/22  00:02    


 


Urine pH  5.0  (5.0-7.0)   06/14/22  00:02    


 


Ur Specific Gravity  1.010  (1.003-1.030)   06/14/22  00:02    


 


Urine Protein  <15 mg/dl mg/dL (Negative)   06/14/22  00:02    


 


Urine Glucose (UA)  Neg mg/dL (Negative)   06/14/22  00:02    


 


Urine Ketones  Neg mg/dL (Negative)   06/14/22  00:02    


 


Urine Blood  Neg  (Negative)   06/14/22  00:02    


 


Urine Nitrite  Neg  (Negative)   06/14/22  00:02    


 


Ur Reducing Substances  Not Reportable   06/14/22  00:02    


 


Urine Bilirubin  Neg  (Negative)   06/14/22  00:02    


 


Urine Ictotest  Not Reportable   06/14/22  00:02    


 


Urine Urobilinogen  < 2.0 mg/dL (<2.0)   06/14/22  00:02    


 


Ur Leukocyte Esterase  Neg  (Negative)   06/14/22  00:02    


 


Urine WBC (Auto)  2.0 /HPF (0.0-6.0)   06/14/22  00:02    


 


Urine RBC (Auto)  1.0 /HPF (0.0-6.0)   06/14/22  00:02    


 


U Epithel Cells (Auto)  < 1.0 /HPF (0-13.0)   06/14/22  00:02    


 


Urine Bacteria (Auto)  1+ /HPF (Negative)   06/14/22  00:02    


 


Urine Mucus  Few /HPF  06/14/22  00:02    


 


Urine Yeast (Budding)  1+ /HPF  06/14/22  00:02    


 


Urine Creatinine  117.7 mg/dL (0.1-20.0)  H  06/14/22  03:00    


 


Urine Sodium  66 mmol/L  06/14/22  03:00    











Microbiology: 


Microbiology





06/11/22 23:23   Peripheral/Venous   Blood Culture - Preliminary


                            NO GROWTH AFTER 4 DAYS


06/12/22 00:10   Peripheral/Venous   Blood Culture - Preliminary


                            NO GROWTH AFTER 4 DAYS








[place]/IV: 


                                        





Voiding Method                   Urinal











Active Medications





- Current Medications


Current Medications: 














Generic Name Dose Route Start Last Admin





  Trade Name Freq  PRN Reason Stop Dose Admin


 


Acetaminophen  650 mg  06/12/22 03:36 





  Acetaminophen 325 Mg Tab  PO  





  Q4H PRN  





  Pain MILD(1-3)/Fever >100.5/HA  


 


Dextrose  0 ml  06/12/22 03:36  06/14/22 15:47





  Dextrose 50% In Water (25gm) 50 Ml Syringe  IV   50 ml





  Q30MIN PRN   Administration





  Hypoglycemia  





  Protocol  


 


Insulin Human Lispro  0 unit  06/12/22 07:30  06/16/22 17:04





  Insulin Lispro 100 Unit/Ml  SUB-Q   Not Given





  [facility]  





  Protocol  


 


Loperamide HCl  2 mg  06/12/22 17:29  06/14/22 17:45





  Loperamide 2 Mg Cap  PO   2 mg





  Q2H PRN   Administration





  Diarrhea  


 


Magnesium Hydroxide  30 ml  06/12/22 03:36 





  Magnesium Hydroxide (Mom) Oral Liqd Udc  PO  





  Q4H PRN  





  Constipation  


 


Morphine Sulfate  2 mg  06/12/22 03:36 





  Morphine 2 Mg/1 Ml Inj  IV  





  Q4H PRN  





  Pain, Moderate (4-6)  


 


Morphine Sulfate  4 mg  06/12/22 03:36 





  Morphine 4 Mg/1 Ml Inj  IV  





  Q4H PRN  





  Pain , Severe (7-10)  


 


Ondansetron HCl  4 mg  06/12/22 03:36  06/14/22 08:52





  Ondansetron 4 Mg/2 Ml Inj  IV   4 mg





  Q8H PRN   Administration





  Nausea And Vomiting  


 


Sodium Bicarbonate  1,300 mg  06/15/22 14:00  06/16/22 16:04





  Sodium Bicarbonate 650 Mg Tab  PO   1,300 mg





  TID RIN   Administration


 


Sodium Chloride  10 ml  06/12/22 10:00  06/16/22 10:22





  Sodium Chloride 0.9% 10 Ml Flush Syringe  IV   10 ml





  BID RIN   Administration


 


Sodium Chloride  10 ml  06/12/22 03:36 





  Sodium Chloride 0.9% 10 Ml Flush Syringe  IV  





  PRN PRN  





  LINE FLUSH  














Nutrition/Malnutrition Assess





- Dietary Evaluation


Nutrition/Malnutrition Findings: 


                                        





Nutrition Notes                                            Start:  06/12/22 

12:30


Freq:                                                      Status: Active       




Protocol:                                                                       




 Document     06/15/22 12:47  ROHAN  (Rec: 06/15/22 13:13  ROHAN  [id number])


 Nutrition Notes


     Initial or Follow up                        Assessment


     Current Diagnosis                           Acute Kidney Injury,CKD(stage


                                                 I-IV)


     Other Pertinent Diagnosis                   Diarrhea, Hyperkalemia,


                                                 Asimptomatic Hypoglycemia,


                                                 Kidney Cyst.


     Current Diet                                Renal Diet (since L 06/15).


     Labs/Tests                                  06/15: Na 135, K 5.7, CO2 17,


                                                 BUN 56, Crea 4.1, Ca 8.3, Mg 1


                                                 .5.


     Pertinent Medications                       06/15: D10w 1000ml @ 75ml/hr,


                                                 others nutritionally


                                                 unremarkable.


     Height                                      5 ft 10 in


     Weight                                      88.8 kg


     Ideal Body Weight (kg)                      75.45


     BMI                                         28.0


     Intake Prior to Admission                   Good


     Weight change and time frame                Pt denies having loss body


                                                 weight PTA.


                                                 1.9 Kg body weigh loss in 3


                                                 days reported.


     Weight Status                               Overweight


     Subjective/Other Information                RD consult for routine F/U on


                                                 dietrary advancement.


                                                 Pt's PO intake of meals has


                                                 been Good(75%) and well


                                                 tolerated, according to ADL


                                                 notes.


                                                 Pt is on Room Air, O2


                                                 saturation @ 96%, according to


                                                 Physical Assessment History


                                                 notes.


                                                 Pt has missing teeth,


                                                 according to Physical


                                                 Assessment History notes.


                                                 Pt continues to present


                                                 diarrhea, according to


                                                 Physical Assessment History


                                                 notes.


                                                 Pt still in critical condition


                                                 , not a candidate for


                                                 Nutrition Education at the


                                                 time, will assess feasibility


                                                 on F/U.


     Percent of energy/protein needs met:        Prescribed Renal Diet provides


                                                 for energy/protein needs (2,


                                                 072 Kcal/77 g) during LOS.


     Burn                                        Absent


     Trauma                                      Absent


     GI Symptoms                                 Diarrhea


     Food Allergy                                No


     Skin Integrity/Comment                      Assessment WNL.


     Current % PO                                Good (%)


     Minimum of two criteria                     No


     Fluid Accumulation                          N/A


     Reduced  Strength                       N/A (non-severe)


     Protein-Calorie Malnutrition                N\A


     #1


      Nutrition Diagnosis                        No nutrition diagnosis at this


                                                 time


     Is patient on ventilator?                   No


     Is Patient Ambulatory and/or Out of Bed     No


     REE-([facility]-confined to bed)      [phone]


     Calculation Used for Recommendations        [facility]


     Additional Notes                            Protein: 0.8-1.2 g/Kg ABW; 71-


                                                 107 g/day.


                                                 Fluids: 1 ml/Kcal, or as per


                                                 MD.


 Nutrition Intervention


     Change Diet Order:                          Continue Renal Diet.


     Follow-Up By:                               06/22/22


     Additional Comments                         Nutrition education will be


                                                 provided at F/U, if feasible.


                                                 Continue monitoring food


                                                 tolerance, %PO intake of meals


                                                 , and BM.

## 2022-06-16 NOTE — PROGRESS NOTE
Assessment and Plan





Impression:


* Acute kidney injury secondary volume depletion on chronic kidney disease


   --Baseline renal function unknown; followed by [place]


   --UA sediment bland


* Hyperkalemia, severe - resolved


* Metabolic acidosis


* Diarrhea


* Hypoglycemia


* Cyst of kidney, acquired


   --Renal ultrasound (June 13): nml echogenicity; 1cm exophytic cyst, right 

kidney





Plan:


* No acute indication for renal replacement therapy at this time - renal 

  function has gradually improved


* Renal diet to restrict dietary K


* Continue po bicarbonate


* Glycemic control per primary team


* Dose medications for renal function


* Avoid potential nephrotoxins


* Strict I/O


* AM labs





Subjective


Date of service: 06/16/22


Interval history: 





Patient has no complaints today





Objective





- Vital Signs


Vital signs: 


                               Vital Signs - 12hr











  06/15/22 06/15/22 06/16/22





  22:00 23:31 04:52


 


Temperature  98.5 F 99 F


 


Pulse Rate 70 78 61


 


Respiratory  18 16





Rate   


 


Blood Pressure  128/79 129/83





[Right]   


 


O2 Sat by Pulse  98 96





Oximetry   














- General Appearance


General appearance: well-developed, well-nourished


EENT: ATNC


Respiratory: Present: Clear to Ascultation


Cardiology: regular, S1S2


Gastrointestinal: normoactive bowel sounds


Integumentary: warm and dry


Neurologic: alert and oriented x3


Psychiatric: cooperative





- Lab





                                 06/13/22 05:27





                                 06/17/22 04:18


                             Most recent lab results











Calcium  8.5 mg/dL (8.4-10.2)   06/16/22  04:35    


 


Magnesium  1.60 mg/dL (1.7-2.3)  L  06/16/22  04:35    


 


Urine Creatinine  117.7 mg/dL (0.1-20.0)  H  06/14/22  03:00    


 


Urine Sodium  66 mmol/L  06/14/22  03:00    














Medications & Allergies





- Medications


Allergies/Adverse Reactions: 


                                    Allergies





No Known Allergies Allergy (Unverified 06/11/22 23:05)


   








Home Medications: 


                                Home Medications











 Medication  Instructions  Recorded  Confirmed  Last Taken  Type


 


Furosemide [Lasix] 40 mg PO DAILY 06/15/22 06/15/22 06/11/22 History


 


Gabapentin [Neurontin] 600 mg PO Q8H 06/15/22 06/15/22 06/11/22 History


 


Losartan [Cozaar] 100 mg PO QDAY 06/15/22 06/15/22 06/11/22 History


 


Metoprolol Xl [Metoprolol 50 mg PO QDAY 06/15/22 06/15/22 06/11/22 History





SUCCINATE ER TAB]     


 


Pantoprazole [Protonix] 40 mg PO QDAY 06/15/22 06/15/22 06/11/22 History


 


Potassium Chloride [K-Dur] 10 meq PO QDAY 06/15/22 06/15/22 06/11/22 History


 


amLODIPine [Norvasc] 10 mg PO DAILY 06/15/22 06/15/22 06/11/22 History


 


cilostazoL [Pletal] 50 mg PO BID 06/15/22 06/15/22 06/11/22 History











Active Medications: 














Generic Name Dose Route Start Last Admin





  Trade Name Freq  PRN Reason Stop Dose Admin


 


Acetaminophen  650 mg  06/12/22 03:36 





  Acetaminophen 325 Mg Tab  PO  





  Q4H PRN  





  Pain MILD(1-3)/Fever >100.5/HA  


 


Dextrose  0 ml  06/12/22 03:36  06/14/22 15:47





  Dextrose 50% In Water (25gm) 50 Ml Syringe  IV   50 ml





  Q30MIN PRN   Administration





  Hypoglycemia  





  Protocol  


 


Dextrose  1,000 mls @ 75 mls/hr  06/14/22 17:00  06/16/22 04:57





  D10w  IV   75 mls/hr





  AS DIRECT RIN   Administration


 


Insulin Human Lispro  0 unit  06/12/22 07:30  06/16/22 08:09





  Insulin Lispro 100 Unit/Ml  SUB-Q   Not Given





  [facility]  





  Protocol  


 


Loperamide HCl  2 mg  06/12/22 17:29  06/14/22 17:45





  Loperamide 2 Mg Cap  PO   2 mg





  Q2H PRN   Administration





  Diarrhea  


 


Magnesium Hydroxide  30 ml  06/12/22 03:36 





  Magnesium Hydroxide (Mom) Oral Liqd Udc  PO  





  Q4H PRN  





  Constipation  


 


Morphine Sulfate  2 mg  06/12/22 03:36 





  Morphine 2 Mg/1 Ml Inj  IV  





  Q4H PRN  





  Pain, Moderate (4-6)  


 


Morphine Sulfate  4 mg  06/12/22 03:36 





  Morphine 4 Mg/1 Ml Inj  IV  





  Q4H PRN  





  Pain , Severe (7-10)  


 


Ondansetron HCl  4 mg  06/12/22 03:36  06/14/22 08:52





  Ondansetron 4 Mg/2 Ml Inj  IV   4 mg





  Q8H PRN   Administration





  Nausea And Vomiting  


 


Sodium Bicarbonate  1,300 mg  06/15/22 14:00  06/15/22 20:19





  Sodium Bicarbonate 650 Mg Tab  PO   1,300 mg





  TID RIN   Administration


 


Sodium Chloride  10 ml  06/12/22 10:00  06/15/22 21:25





  Sodium Chloride 0.9% 10 Ml Flush Syringe  IV   10 ml





  BID RIN   Administration


 


Sodium Chloride  10 ml  06/12/22 03:36 





  Sodium Chloride 0.9% 10 Ml Flush Syringe  IV  





  PRN PRN  





  LINE FLUSH

## 2022-06-17 VITALS — DIASTOLIC BLOOD PRESSURE: 76 MMHG | SYSTOLIC BLOOD PRESSURE: 144 MMHG

## 2022-06-17 LAB
BUN SERPL-MCNC: 47 MG/DL (ref 9–20)
BUN/CREAT SERPL: 14 %
CALCIUM SERPL-MCNC: 8.7 MG/DL (ref 8.4–10.2)
HEMOLYSIS INDEX: 54

## 2022-06-17 RX ADMIN — INSULIN LISPRO SCH: 100 INJECTION, SOLUTION INTRAVENOUS; SUBCUTANEOUS at 10:33

## 2022-06-17 RX ADMIN — Medication SCH ML: at 10:33

## 2022-06-17 RX ADMIN — SODIUM BICARBONATE SCH MG: 650 TABLET ORAL at 10:33

## 2023-02-14 NOTE — DISCHARGE SUMMARY
Providers





- Providers


Date of Admission: 


06/12/22 03:36





Date of discharge: 06/17/22


Attending physician: 


AMY J KOCHERLA





                                        





06/12/22 02:19


Consult to Physician [CONS] Stat 


   Comment: 


   Consulting Provider: LINDA VIEIRA


   Physician Instructions: 


   Reason For Exam: Acute renal failure with acute hyperkalemia





06/12/22 03:38


Consult to Dietitian/Nutrition [CONS] Routine 


   Physician Instructions: 


   Reason For Exam: 


   Reason for Consult: Diet education











Primary care physician: 


VALERIANO SHRESTHA








Hospitalization


Condition: Stable


Hospital course: 


77-year-old male patient with known history of atrial fibrillation presents to 

the emergency room via EMS for evaluation of abnormal labs done at the [facility] urgent care facility.  Patient had gone to the urgent care facility 

with a complaint of diarrhea which has been ongoing for about a week.  Labs done

 at the urgent care facility reveals elevated potassium level of 7.2 and 

creatinine of 8.3.  


Patient was given insulin and glucose and nebulizing treatment at the facility 

and sent to [facility] ER for further evaluation and management.





Assessment:


--Acute renal failure likely ATN with history of underlying CKD nephrology 

following


Creatinine trending down from 8.2-3.7 today





-- Severe hyperkalemia; improved


Corrected per protocol, follow electrolytes





--Episodes of hypoglycemia; slightly improved


D50 as needed, patient received D10 W


Check hemoglobin A1c 6.4





--Hyponatremia; improved





-Diarrhea-etiology unclear;


Resolved





-- Sinus rhythm with multiple PVCs[no evidence of A. fib]


Continue supportive care














Disposition: 01 HOME / SELF CARE / HOMELESS


Final Discharge Diagnosis (Prints w/discharge instructions): Acute renal failure

likely ATN.  Severe hyperkalemia improved.  Episodes of hypoglycemia; improved. 

Diarrhea probably viral; resolved.  Sinus rhythm with multiple PVCs[no evidence 

of A. fib].  Hyponatremia improved hyponatremia


Time spent for discharge: 35 min





Exam





- Constitutional


Vitals: 


                                        











Temp Pulse Resp BP Pulse Ox


 


 98.9 F   48 L  20   144/76   95 


 


 06/17/22 03:40  06/17/22 03:40  06/17/22 03:40  06/17/22 03:40  06/17/22 03:40














Plan


Activity: advance as tolerated


Diet: renal


Additional Instructions: You have worsening symptoms contact MD or go to the 

nearest emergency room as needed.  Advised to take potassium low diet.  Follow 

primary care physician on Monday, 6/20/2022 at which time check your BMP


Follow up with: 


VALERIANO SHRESTHA MD [Primary Care Provider] - 7 Days


Prescriptions: 


Sodium Zirconium Cyclosilicate [Lokelma] 10 gm PO DAILY #3


Sodium Bicarbonate 1,300 mg PO TID #30 tablet
No